# Patient Record
Sex: FEMALE | Race: WHITE | NOT HISPANIC OR LATINO
[De-identification: names, ages, dates, MRNs, and addresses within clinical notes are randomized per-mention and may not be internally consistent; named-entity substitution may affect disease eponyms.]

---

## 2018-05-21 ENCOUNTER — APPOINTMENT (OUTPATIENT)
Dept: ORTHOPEDIC SURGERY | Facility: CLINIC | Age: 78
End: 2018-05-21
Payer: MEDICARE

## 2018-05-21 VITALS — WEIGHT: 200 LBS | BODY MASS INDEX: 34.15 KG/M2 | HEIGHT: 64 IN

## 2018-05-21 PROCEDURE — 20552 NJX 1/MLT TRIGGER POINT 1/2: CPT

## 2018-05-21 PROCEDURE — 99214 OFFICE O/P EST MOD 30 MIN: CPT | Mod: 25

## 2018-09-17 ENCOUNTER — APPOINTMENT (OUTPATIENT)
Dept: ORTHOPEDIC SURGERY | Facility: CLINIC | Age: 78
End: 2018-09-17
Payer: MEDICARE

## 2018-09-17 VITALS — SYSTOLIC BLOOD PRESSURE: 125 MMHG | DIASTOLIC BLOOD PRESSURE: 81 MMHG | HEART RATE: 85 BPM

## 2018-09-17 PROCEDURE — 20552 NJX 1/MLT TRIGGER POINT 1/2: CPT

## 2018-09-17 PROCEDURE — 99214 OFFICE O/P EST MOD 30 MIN: CPT | Mod: 25

## 2018-10-15 ENCOUNTER — APPOINTMENT (OUTPATIENT)
Dept: ORTHOPEDIC SURGERY | Facility: CLINIC | Age: 78
End: 2018-10-15
Payer: MEDICARE

## 2018-10-15 VITALS
DIASTOLIC BLOOD PRESSURE: 80 MMHG | HEIGHT: 64 IN | SYSTOLIC BLOOD PRESSURE: 127 MMHG | BODY MASS INDEX: 34.15 KG/M2 | WEIGHT: 200 LBS | HEART RATE: 80 BPM

## 2018-10-15 PROCEDURE — 20552 NJX 1/MLT TRIGGER POINT 1/2: CPT

## 2018-10-15 PROCEDURE — 99214 OFFICE O/P EST MOD 30 MIN: CPT | Mod: 25

## 2018-10-25 ENCOUNTER — APPOINTMENT (OUTPATIENT)
Dept: ORTHOPEDIC SURGERY | Facility: CLINIC | Age: 78
End: 2018-10-25
Payer: MEDICARE

## 2018-10-25 VITALS
HEART RATE: 90 BPM | HEIGHT: 64 IN | DIASTOLIC BLOOD PRESSURE: 84 MMHG | BODY MASS INDEX: 34.15 KG/M2 | WEIGHT: 200 LBS | SYSTOLIC BLOOD PRESSURE: 129 MMHG

## 2018-10-25 PROCEDURE — 20611 DRAIN/INJ JOINT/BURSA W/US: CPT | Mod: RT

## 2018-10-25 PROCEDURE — 99213 OFFICE O/P EST LOW 20 MIN: CPT | Mod: 25

## 2018-12-06 ENCOUNTER — APPOINTMENT (OUTPATIENT)
Dept: ORTHOPEDIC SURGERY | Facility: CLINIC | Age: 78
End: 2018-12-06
Payer: MEDICARE

## 2018-12-06 PROCEDURE — 20611 DRAIN/INJ JOINT/BURSA W/US: CPT | Mod: RT

## 2018-12-06 PROCEDURE — 99213 OFFICE O/P EST LOW 20 MIN: CPT | Mod: 25

## 2019-01-11 ENCOUNTER — APPOINTMENT (OUTPATIENT)
Dept: ORTHOPEDIC SURGERY | Facility: CLINIC | Age: 79
End: 2019-01-11
Payer: MEDICARE

## 2019-01-11 VITALS
HEART RATE: 84 BPM | BODY MASS INDEX: 34.15 KG/M2 | DIASTOLIC BLOOD PRESSURE: 80 MMHG | WEIGHT: 200 LBS | HEIGHT: 64 IN | SYSTOLIC BLOOD PRESSURE: 121 MMHG

## 2019-01-11 PROCEDURE — 99213 OFFICE O/P EST LOW 20 MIN: CPT

## 2019-01-11 NOTE — ADDENDUM
[FreeTextEntry1] : This note was authored by Ayah Westbrook working as a medical scribe for Dr. David Avila. The note was reviewed, edited, and revised by Dr. David Avila whom is in agreement with the exam findings, imaging findings, and treatment plan. 01/11/2019.

## 2019-01-11 NOTE — HISTORY OF PRESENT ILLNESS
[Pain] : pain [Stable] : stable [All Other ROS Normal] : All other review of systems are negative except as noted [Headache] : no headache [Dizziness] : no dizziness [Fainting] : no fainting [FreeTextEntry1] : right SI joint pain\par had injections and Chiropractic care\par last injection helped [FreeTextEntry2] : Pt reports that SIJ injection under ultrasound have helped but the pain returns.\par no leg pains\par TPI by Dr. De Jesus, helped \par No fever chills sweats nausea vomiting no bowel,  no recent weight loss or gain no night pain. This history is in addition to the intake form that I personally reviewed. \par

## 2019-01-11 NOTE — PHYSICAL EXAM
[Obese] : obese [Normal] : normal [Limited] : is limited [UE/LE] : Sensory: Intact in bilateral upper & lower extremities [ALL] : dorsalis pedis, posterior tibial, femoral, popliteal, and radial 2+ and symmetric bilaterally [Poor Appearance] : well-appearing [Acute Distress] : not in acute distress [Abl Mood] : in a normal mood [Abl Affect] : with normal affect [Poor Coordination] : normal coordination [Disorientation] : oriented x 3 [SLR] : negative straight leg raise [FreeTextEntry2] : right SI joint pain\par otherwise, 5 out of 5 motor strength, sensation is intact and symmetrical full range of motion flexion extension and rotation, no palpatory tenderness full range of motion of hips knees shoulders and elbows (all four extremities), no atrophy, negative straight leg raise, no pathological reflexes, no swelling, normal ambulation, no apparent distress skin is intact, no palpable lymph nodes, no upper or lower extremity instability, alert and oriented x3 and normal mood. Normal finger-to nose test.  [de-identified] : MRI lumbar 10/16-moderate stenosis above at [id number] with the patient.

## 2019-01-11 NOTE — DISCUSSION/SUMMARY
[de-identified] : right sacroiliitis\par failed injections\par wants to discuss SI joing fusion\par Dr. Dexter Simeon for consult SIJ fusion\par All options discussed including rest, medicine, home exercise, acupuncture, Chiropractic care, Physical Therapy, Pain management, and last resort surgery. \par All questions were answered, all alternatives discussed and the patient is in complete agreement with that plan. Follow-up appointment as instructed. Any issues and the patient will call or come in sooner.

## 2019-01-22 ENCOUNTER — APPOINTMENT (OUTPATIENT)
Dept: ORTHOPEDIC SURGERY | Facility: CLINIC | Age: 79
End: 2019-01-22
Payer: MEDICARE

## 2019-01-22 VITALS
BODY MASS INDEX: 34.15 KG/M2 | DIASTOLIC BLOOD PRESSURE: 83 MMHG | SYSTOLIC BLOOD PRESSURE: 130 MMHG | HEART RATE: 111 BPM | WEIGHT: 200 LBS | HEIGHT: 64 IN

## 2019-01-22 PROCEDURE — 72190 X-RAY EXAM OF PELVIS: CPT

## 2019-01-22 PROCEDURE — 99214 OFFICE O/P EST MOD 30 MIN: CPT

## 2019-01-22 RX ORDER — ESOMEPRAZOLE MAGNESIUM 40 MG/1
CAPSULE, DELAYED RELEASE ORAL
Refills: 0 | Status: ACTIVE | COMMUNITY

## 2019-01-22 RX ORDER — CELECOXIB 200 MG/1
200 CAPSULE ORAL
Refills: 0 | Status: ACTIVE | COMMUNITY

## 2019-01-22 NOTE — HISTORY OF PRESENT ILLNESS
[de-identified] : 79 yo F referred by Dr Jamison for evaluation of Sacroiliitis . Hx of Lumbar spine 4/5 Fusion with continued LBP and sacral pain. She has difficulty with stairs and ADLs. She has been treated in the past with PT, which has not helped and injections which have temporarily helped. Previous injctions have be US guided

## 2019-01-22 NOTE — DISCUSSION/SUMMARY
[de-identified] : This patient may be a candidate for percutaneous sacroiliac joint fusion due to the significant degenerative appearance on x-ray. However, we have not yet obtained a CT scan of the pelvis. Nor, having done a CT guided injection. We will obtain a CT guided injection of the right sacroiliac joint. We will see if this helps with her pain. I also believe that her significant abductor weakness as a contributing factor. The need for strengthening and rehabilitation after surgery and noted to obtain a long-standing positive results as discussed. She'll follow up 2 weeks after the CT guided injection so we can finalize plans

## 2019-01-22 NOTE — PHYSICAL EXAM
[de-identified] : Examination of back and pelvic region:  There previously well-healed incisions her mid line lumbar back region. There is no tenderness in this area. There is mild paraspinal spasm. Patient is severely obese. There is tenderness over the gluteal musculature. Sciatic notch is unable to be palpated. Mild right sided pain on flexion, abduction, external rotation.  no pain on the left with the same maneuver. She has significantly weak abductors and quadriceps 4-/5 [de-identified] : 3 views of the pelvis show a previously placed lumbar hardware. There is significant arthrosis of the bilateral sacroiliac joints

## 2019-01-24 ENCOUNTER — FORM ENCOUNTER (OUTPATIENT)
Age: 79
End: 2019-01-24

## 2019-01-25 ENCOUNTER — OUTPATIENT (OUTPATIENT)
Dept: OUTPATIENT SERVICES | Facility: HOSPITAL | Age: 79
LOS: 1 days | End: 2019-01-25
Payer: MEDICARE

## 2019-01-25 ENCOUNTER — APPOINTMENT (OUTPATIENT)
Dept: CT IMAGING | Facility: CLINIC | Age: 79
End: 2019-01-25
Payer: MEDICARE

## 2019-01-25 DIAGNOSIS — M20.009 UNSPECIFIED DEFORMITY OF UNSPECIFIED FINGER(S): Chronic | ICD-10-CM

## 2019-01-25 DIAGNOSIS — M46.1 SACROILIITIS, NOT ELSEWHERE CLASSIFIED: ICD-10-CM

## 2019-01-25 DIAGNOSIS — Z98.89 OTHER SPECIFIED POSTPROCEDURAL STATES: Chronic | ICD-10-CM

## 2019-01-25 PROCEDURE — G0260: CPT

## 2019-01-25 PROCEDURE — G0260: CPT | Mod: RT

## 2019-01-25 PROCEDURE — 27096 INJECT SACROILIAC JOINT: CPT | Mod: RT

## 2019-02-12 ENCOUNTER — APPOINTMENT (OUTPATIENT)
Dept: ORTHOPEDIC SURGERY | Facility: CLINIC | Age: 79
End: 2019-02-12
Payer: MEDICARE

## 2019-02-12 PROCEDURE — 99214 OFFICE O/P EST MOD 30 MIN: CPT

## 2019-02-12 NOTE — DISCUSSION/SUMMARY
[de-identified] : This patient is a candidate for percutaneous sacroiliac joint fusion due to the significant degenerative appearance on x-ray And her positive response to the CT-guided injection.  The need for strengthening and rehabilitation after surgery and noted to obtain a long-standing positive results as discussed. She wishes to proceed with percutaneous sacroiliac joint fusion. The details of the procedure discussed in detail.\par \par The risks benefits and alternatives of the procedure been discussed with the patient. The risks including but not limited to bleeding, infection, damage to nerves and/or blood vessels, nonunion, malunion, hardware failure, DVT, PE, loss of limb, loss of life were explained to the patient who understands these risks and will consent to the procedure. The patient will go for preoperative testing.\par

## 2019-02-12 NOTE — HISTORY OF PRESENT ILLNESS
[de-identified] : 77 yo F referred by Dr Jamison for evaluation of Sacroiliitis . Hx of Lumbar spine 4/5 Fusion with continued LBP and sacral pain. She has difficulty with stairs and ADLs. She has been treated in the past with PT, which has not helped and injections which have temporarily helped. Previous injctions have be US guided.  She returns today for followup after her CT guided sacroiliac joint injection on the right side. The patient states that she had 10 days of complete pain relief after the injection

## 2019-02-12 NOTE — PHYSICAL EXAM
[de-identified] : Examination of back and pelvic region:  There previously well-healed incisions her mid line lumbar back region. There is no tenderness in this area. There is mild paraspinal spasm. Patient is severely obese. There is tenderness over the gluteal musculature. Sciatic notch is unable to be palpated. Mild right sided pain on flexion, abduction, external rotation.  no pain on the left with the same maneuver. She has significantly weak abductors and quadriceps 4-/5 [de-identified] : 3 views of the pelvis show a previously placed lumbar hardware. There is significant arthrosis of the bilateral sacroiliac joints

## 2019-02-20 ENCOUNTER — OUTPATIENT (OUTPATIENT)
Dept: OUTPATIENT SERVICES | Facility: HOSPITAL | Age: 79
LOS: 1 days | End: 2019-02-20
Payer: MEDICARE

## 2019-02-20 VITALS
HEIGHT: 64 IN | HEART RATE: 84 BPM | DIASTOLIC BLOOD PRESSURE: 82 MMHG | TEMPERATURE: 98 F | SYSTOLIC BLOOD PRESSURE: 120 MMHG | RESPIRATION RATE: 16 BRPM | OXYGEN SATURATION: 97 % | WEIGHT: 212.31 LBS

## 2019-02-20 DIAGNOSIS — Z01.818 ENCOUNTER FOR OTHER PREPROCEDURAL EXAMINATION: ICD-10-CM

## 2019-02-20 DIAGNOSIS — M46.1 SACROILIITIS, NOT ELSEWHERE CLASSIFIED: ICD-10-CM

## 2019-02-20 DIAGNOSIS — Z98.890 OTHER SPECIFIED POSTPROCEDURAL STATES: Chronic | ICD-10-CM

## 2019-02-20 DIAGNOSIS — Z91.040 LATEX ALLERGY STATUS: ICD-10-CM

## 2019-02-20 DIAGNOSIS — M20.009 UNSPECIFIED DEFORMITY OF UNSPECIFIED FINGER(S): Chronic | ICD-10-CM

## 2019-02-20 DIAGNOSIS — Z98.89 OTHER SPECIFIED POSTPROCEDURAL STATES: Chronic | ICD-10-CM

## 2019-02-20 LAB
ANION GAP SERPL CALC-SCNC: 12 MMOL/L — SIGNIFICANT CHANGE UP (ref 5–17)
BLD GP AB SCN SERPL QL: NEGATIVE — SIGNIFICANT CHANGE UP
BUN SERPL-MCNC: 22 MG/DL — SIGNIFICANT CHANGE UP (ref 7–23)
CALCIUM SERPL-MCNC: 9.9 MG/DL — SIGNIFICANT CHANGE UP (ref 8.4–10.5)
CHLORIDE SERPL-SCNC: 106 MMOL/L — SIGNIFICANT CHANGE UP (ref 96–108)
CO2 SERPL-SCNC: 23 MMOL/L — SIGNIFICANT CHANGE UP (ref 22–31)
CREAT SERPL-MCNC: 0.96 MG/DL — SIGNIFICANT CHANGE UP (ref 0.5–1.3)
GLUCOSE SERPL-MCNC: 105 MG/DL — HIGH (ref 70–99)
HCT VFR BLD CALC: 46.7 % — HIGH (ref 34.5–45)
HGB BLD-MCNC: 14.7 G/DL — SIGNIFICANT CHANGE UP (ref 11.5–15.5)
MCHC RBC-ENTMCNC: 31.5 GM/DL — LOW (ref 32–36)
MCHC RBC-ENTMCNC: 32.5 PG — SIGNIFICANT CHANGE UP (ref 27–34)
MCV RBC AUTO: 103.1 FL — HIGH (ref 80–100)
PLATELET # BLD AUTO: 293 K/UL — SIGNIFICANT CHANGE UP (ref 150–400)
POTASSIUM SERPL-MCNC: 4.8 MMOL/L — SIGNIFICANT CHANGE UP (ref 3.5–5.3)
POTASSIUM SERPL-SCNC: 4.8 MMOL/L — SIGNIFICANT CHANGE UP (ref 3.5–5.3)
RBC # BLD: 4.53 M/UL — SIGNIFICANT CHANGE UP (ref 3.8–5.2)
RBC # FLD: 14.4 % — SIGNIFICANT CHANGE UP (ref 10.3–14.5)
RH IG SCN BLD-IMP: POSITIVE — SIGNIFICANT CHANGE UP
SODIUM SERPL-SCNC: 141 MMOL/L — SIGNIFICANT CHANGE UP (ref 135–145)
WBC # BLD: 7.07 K/UL — SIGNIFICANT CHANGE UP (ref 3.8–10.5)
WBC # FLD AUTO: 7.07 K/UL — SIGNIFICANT CHANGE UP (ref 3.8–10.5)

## 2019-02-20 PROCEDURE — 85027 COMPLETE CBC AUTOMATED: CPT

## 2019-02-20 PROCEDURE — 86900 BLOOD TYPING SEROLOGIC ABO: CPT

## 2019-02-20 PROCEDURE — 80048 BASIC METABOLIC PNL TOTAL CA: CPT

## 2019-02-20 PROCEDURE — G0463: CPT

## 2019-02-20 PROCEDURE — 86850 RBC ANTIBODY SCREEN: CPT

## 2019-02-20 PROCEDURE — 86901 BLOOD TYPING SEROLOGIC RH(D): CPT

## 2019-02-20 RX ORDER — SODIUM CHLORIDE 9 MG/ML
3 INJECTION INTRAMUSCULAR; INTRAVENOUS; SUBCUTANEOUS EVERY 8 HOURS
Qty: 0 | Refills: 0 | Status: DISCONTINUED | OUTPATIENT
Start: 2019-03-07 | End: 2019-03-07

## 2019-02-20 RX ORDER — LIDOCAINE HCL 20 MG/ML
0.2 VIAL (ML) INJECTION ONCE
Qty: 0 | Refills: 0 | Status: DISCONTINUED | OUTPATIENT
Start: 2019-03-07 | End: 2019-03-07

## 2019-02-20 RX ORDER — VANCOMYCIN HCL 1 G
1500 VIAL (EA) INTRAVENOUS ONCE
Qty: 0 | Refills: 0 | Status: DISCONTINUED | OUTPATIENT
Start: 2019-03-07 | End: 2019-03-22

## 2019-02-20 NOTE — H&P PST ADULT - PSH
S/P lumbar laminectomy  w/fusion 6/2014  S/P wrist surgery  tendonitis- bilaterally H/O lithotripsy  2015 - unsuccessful; passed stone on own 1/2018  S/P lumbar laminectomy  w/fusion 6/2014  S/P wrist surgery  tendonitis- bilaterally

## 2019-02-20 NOTE — H&P PST ADULT - NSANTHOSAYNRD_GEN_A_CORE
No. ANGI screening performed.  STOP BANG Legend: 0-2 = LOW Risk; 3-4 = INTERMEDIATE Risk; 5-8 = HIGH Risk

## 2019-02-20 NOTE — H&P PST ADULT - HISTORY OF PRESENT ILLNESS
75 year old female with Lumbar stenosis d/p laminectomy with fusion 6/2014 with complaints of lower back pain and right hip pain worsening since 8/2015 s/p MRI  lumbar with findings of left hydronephrosis further work up revealed Left ureter calculus with left mod-severe hydronephrosis planned for Left ESWL, Left stent insertion, possible left ureteroscopy. 78 year old female with h/o hypothyroidism, lymphocytic colitis (on Lialda), lumbar spinal stenosis s/p laminectomy with fusion 6/2014 and sacroiliitis with c/o persistent pain in right buttocks, which worsens with standing and limits activity. Pt is scheduled for Right Sacroiliac Joint Fusion on 3/4/2019.

## 2019-02-20 NOTE — H&P PST ADULT - ACTIVITY
active as tolerated, house chores, cleaning, full time job, able to go up and down 1-2 flights without distress. active as tolerated, house chores, cleaning, activity limited by right SI joint painfull time job, able to go up and down 1-2 flights without distress.

## 2019-02-20 NOTE — H&P PST ADULT - PMH
Acquired spondylolisthesis    Back pain, lumbosacral  chronic, radiates to right hip, plan for epidural injections in the future.  De Quervain's tenosynovitis, right  wrist s/p steroid injections x2( last dose 1 week ago  Eczema  legs  Hypothyroidism    Lymphocytic colitis    Obesity    Tendonitis  thumb Acquired spondylolisthesis    Back pain, lumbosacral  prior to spinal fusion  De Quervain's tenosynovitis, right  wrist s/p steroid injections x2( last dose 1 week ago  Eczema  legs  Hypothyroidism    Latex allergy    Lymphocytic colitis    Obesity    Sacroiliitis    Tendonitis  thumb

## 2019-03-06 ENCOUNTER — TRANSCRIPTION ENCOUNTER (OUTPATIENT)
Age: 79
End: 2019-03-06

## 2019-03-07 ENCOUNTER — APPOINTMENT (OUTPATIENT)
Dept: ORTHOPEDIC SURGERY | Facility: HOSPITAL | Age: 79
End: 2019-03-07

## 2019-03-07 ENCOUNTER — OUTPATIENT (OUTPATIENT)
Dept: OUTPATIENT SERVICES | Facility: HOSPITAL | Age: 79
LOS: 1 days | End: 2019-03-07
Payer: MEDICARE

## 2019-03-07 VITALS
TEMPERATURE: 98 F | RESPIRATION RATE: 16 BRPM | SYSTOLIC BLOOD PRESSURE: 140 MMHG | HEART RATE: 84 BPM | DIASTOLIC BLOOD PRESSURE: 77 MMHG | OXYGEN SATURATION: 95 %

## 2019-03-07 VITALS
OXYGEN SATURATION: 97 % | HEIGHT: 64 IN | SYSTOLIC BLOOD PRESSURE: 117 MMHG | TEMPERATURE: 98 F | WEIGHT: 212.31 LBS | DIASTOLIC BLOOD PRESSURE: 83 MMHG | RESPIRATION RATE: 16 BRPM | HEART RATE: 103 BPM

## 2019-03-07 DIAGNOSIS — Z98.890 OTHER SPECIFIED POSTPROCEDURAL STATES: Chronic | ICD-10-CM

## 2019-03-07 DIAGNOSIS — Z01.818 ENCOUNTER FOR OTHER PREPROCEDURAL EXAMINATION: ICD-10-CM

## 2019-03-07 DIAGNOSIS — Z98.89 OTHER SPECIFIED POSTPROCEDURAL STATES: Chronic | ICD-10-CM

## 2019-03-07 DIAGNOSIS — M46.1 SACROILIITIS, NOT ELSEWHERE CLASSIFIED: ICD-10-CM

## 2019-03-07 LAB — RH IG SCN BLD-IMP: POSITIVE — SIGNIFICANT CHANGE UP

## 2019-03-07 PROCEDURE — 76000 FLUOROSCOPY <1 HR PHYS/QHP: CPT

## 2019-03-07 PROCEDURE — 86900 BLOOD TYPING SEROLOGIC ABO: CPT

## 2019-03-07 PROCEDURE — 86901 BLOOD TYPING SEROLOGIC RH(D): CPT

## 2019-03-07 PROCEDURE — 27279 ARTHRD SI JT PLMT TARTCLR DV: CPT | Mod: RT

## 2019-03-07 PROCEDURE — C1776: CPT

## 2019-03-07 PROCEDURE — 97162 PT EVAL MOD COMPLEX 30 MIN: CPT

## 2019-03-07 RX ORDER — VANCOMYCIN HCL 1 G
1000 VIAL (EA) INTRAVENOUS ONCE
Qty: 0 | Refills: 0 | Status: DISCONTINUED | OUTPATIENT
Start: 2019-03-08 | End: 2019-03-22

## 2019-03-07 RX ORDER — SENNA PLUS 8.6 MG/1
2 TABLET ORAL AT BEDTIME
Qty: 0 | Refills: 0 | Status: DISCONTINUED | OUTPATIENT
Start: 2019-03-07 | End: 2019-03-22

## 2019-03-07 RX ORDER — OXYCODONE HYDROCHLORIDE 5 MG/1
5 TABLET ORAL EVERY 4 HOURS
Qty: 0 | Refills: 0 | Status: DISCONTINUED | OUTPATIENT
Start: 2019-03-07 | End: 2019-03-07

## 2019-03-07 RX ORDER — ONDANSETRON 8 MG/1
4 TABLET, FILM COATED ORAL ONCE
Qty: 0 | Refills: 0 | Status: DISCONTINUED | OUTPATIENT
Start: 2019-03-07 | End: 2019-03-07

## 2019-03-07 RX ORDER — TRAMADOL HYDROCHLORIDE 50 MG/1
50 TABLET ORAL EVERY 8 HOURS
Qty: 0 | Refills: 0 | Status: COMPLETED | OUTPATIENT
Start: 2019-03-07 | End: 2019-03-14

## 2019-03-07 RX ORDER — POLYETHYLENE GLYCOL 3350 17 G/17G
17 POWDER, FOR SOLUTION ORAL DAILY
Qty: 0 | Refills: 0 | Status: DISCONTINUED | OUTPATIENT
Start: 2019-03-07 | End: 2019-03-22

## 2019-03-07 RX ORDER — MAGNESIUM HYDROXIDE 400 MG/1
30 TABLET, CHEWABLE ORAL DAILY
Qty: 0 | Refills: 0 | Status: DISCONTINUED | OUTPATIENT
Start: 2019-03-07 | End: 2019-03-22

## 2019-03-07 RX ORDER — ASPIRIN/CALCIUM CARB/MAGNESIUM 324 MG
1 TABLET ORAL
Qty: 60 | Refills: 0
Start: 2019-03-07 | End: 2019-04-05

## 2019-03-07 RX ORDER — ACETAMINOPHEN 500 MG
650 TABLET ORAL EVERY 6 HOURS
Qty: 0 | Refills: 0 | Status: DISCONTINUED | OUTPATIENT
Start: 2019-03-07 | End: 2019-03-22

## 2019-03-07 RX ORDER — HYDROMORPHONE HYDROCHLORIDE 2 MG/ML
0.5 INJECTION INTRAMUSCULAR; INTRAVENOUS; SUBCUTANEOUS
Qty: 0 | Refills: 0 | Status: DISCONTINUED | OUTPATIENT
Start: 2019-03-07 | End: 2019-03-07

## 2019-03-07 RX ORDER — ONDANSETRON 8 MG/1
4 TABLET, FILM COATED ORAL EVERY 6 HOURS
Qty: 0 | Refills: 0 | Status: DISCONTINUED | OUTPATIENT
Start: 2019-03-07 | End: 2019-03-22

## 2019-03-07 RX ORDER — ASPIRIN/CALCIUM CARB/MAGNESIUM 324 MG
325 TABLET ORAL
Qty: 0 | Refills: 0 | Status: DISCONTINUED | OUTPATIENT
Start: 2019-03-07 | End: 2019-03-22

## 2019-03-07 RX ORDER — PANTOPRAZOLE SODIUM 20 MG/1
40 TABLET, DELAYED RELEASE ORAL
Qty: 0 | Refills: 0 | Status: DISCONTINUED | OUTPATIENT
Start: 2019-03-07 | End: 2019-03-22

## 2019-03-07 RX ORDER — DOCUSATE SODIUM 100 MG
100 CAPSULE ORAL THREE TIMES A DAY
Qty: 0 | Refills: 0 | Status: DISCONTINUED | OUTPATIENT
Start: 2019-03-07 | End: 2019-03-22

## 2019-03-07 RX ORDER — MORPHINE SULFATE 50 MG/1
2 CAPSULE, EXTENDED RELEASE ORAL
Qty: 0 | Refills: 0 | Status: DISCONTINUED | OUTPATIENT
Start: 2019-03-07 | End: 2019-03-07

## 2019-03-07 RX ORDER — SODIUM CHLORIDE 9 MG/ML
1000 INJECTION INTRAMUSCULAR; INTRAVENOUS; SUBCUTANEOUS
Qty: 0 | Refills: 0 | Status: DISCONTINUED | OUTPATIENT
Start: 2019-03-07 | End: 2019-03-22

## 2019-03-07 RX ORDER — HYDROMORPHONE HYDROCHLORIDE 2 MG/ML
1 INJECTION INTRAMUSCULAR; INTRAVENOUS; SUBCUTANEOUS EVERY 4 HOURS
Qty: 0 | Refills: 0 | Status: DISCONTINUED | OUTPATIENT
Start: 2019-03-07 | End: 2019-03-07

## 2019-03-07 RX ORDER — DOCUSATE SODIUM 100 MG
1 CAPSULE ORAL
Qty: 0 | Refills: 0 | DISCHARGE
Start: 2019-03-07

## 2019-03-07 RX ORDER — OXYCODONE HYDROCHLORIDE 5 MG/1
10 TABLET ORAL EVERY 4 HOURS
Qty: 0 | Refills: 0 | Status: DISCONTINUED | OUTPATIENT
Start: 2019-03-07 | End: 2019-03-07

## 2019-03-07 RX ORDER — OXYCODONE AND ACETAMINOPHEN 5; 325 MG/1; MG/1
1 TABLET ORAL
Qty: 40 | Refills: 0
Start: 2019-03-07

## 2019-03-07 NOTE — PROGRESS NOTE ADULT - SUBJECTIVE AND OBJECTIVE BOX
ORTHO ATTENDING POST OP    s/p  R   SI joint fusion with I-Fuse  TTWB      R   LE   BID  Crutch Training  Percocet to [facility]   Shower OK w aquacel in place  Pt can be D/D'd from PACU  f/u 2 weeks

## 2019-03-07 NOTE — ASU DISCHARGE PLAN (ADULT/PEDIATRIC) - CALL YOUR DOCTOR IF YOU HAVE ANY OF THE FOLLOWING:
Bleeding that does not stop/Swelling that gets worse/Nausea and vomiting that does not stop/Unable to urinate/Fever greater than (need to indicate Fahrenheit or Celsius)/Wound/Surgical Site with redness, or foul smelling discharge or pus/Numbness, tingling, color or temperature change to extremity/Inability to tolerate liquids or foods/Pain not relieved by Medications

## 2019-03-07 NOTE — ASU DISCHARGE PLAN (ADULT/PEDIATRIC) - ASU DC SPECIAL INSTRUCTIONSFT
Take Tylenol or Motrin as needed for mild pain  Take Percocet as needed, as prescribed  Take Ecotrin twice daily with food for blood clot prevention    Maintain toe-touch weight bearing on the Right leg with crutches. Do not bear full weight with the right leg. You may shower, but keep the right buttock bandage intact.     Follow up in 10-14 days with Dr. Simeon
No

## 2019-03-07 NOTE — PHYSICAL THERAPY INITIAL EVALUATION ADULT - ADDITIONAL COMMENTS
Pt lives in apartment with elevator access to floor however 3 steps to enter and 3 up or down to reach elevators. Prior to admission pt was independent with all functional mobility.

## 2019-03-07 NOTE — PHYSICAL THERAPY INITIAL EVALUATION ADULT - PERTINENT HX OF CURRENT PROBLEM, REHAB EVAL
78 year old female with h/o hypothyroidism, lymphocytic colitis (on Lialda), lumbar spinal stenosis s/p laminectomy with fusion 6/2014 and sacroiliitis with c/o persistent pain in right buttocks, which worsens with standing and limits activity. Pt is scheduled for Right Sacroiliac Joint Fusion on 3/4/2019.

## 2019-03-07 NOTE — ASU DISCHARGE PLAN (ADULT/PEDIATRIC) - CARE PROVIDER_API CALL
Dexter Simeon (MD)  Orthopaedic Surgery  [address]  [place] [address]  Phone: [phone]  Fax: [phone]  Follow Up Time:

## 2019-03-07 NOTE — ASU DISCHARGE PLAN (ADULT/PEDIATRIC) - PAIN MANAGEMENT
Prescription called to pharmacy/[facility] at [place] Prescriptions electronically submitted to pharmacy from [place]/Prescription called to pharmacy/[facility] at [place]

## 2019-03-07 NOTE — BRIEF OPERATIVE NOTE - PROCEDURE
<<-----Click on this checkbox to enter Procedure Arthrodesis, sacroiliac joint  03/07/2019    Active  DSTAL

## 2019-03-07 NOTE — PROGRESS NOTE ADULT - SUBJECTIVE AND OBJECTIVE BOX
ORTHO POC NOTE    Resting without complaints. Seen in PACU. No Chest Pain, SOB, N/V.  Has not voided. Had sena intraop.    T(C): 36.4 (03-07-19 @ 11:00), Max: 36.6 (03-07-19 @ 09:33)  HR: 70 (03-07-19 @ 11:30) (66 - 103)  BP: 135/77 (03-07-19 @ 11:30) (117/83 - 150/106)  RR: 14 (03-07-19 @ 11:00) (14 - 16)  SpO2: 97    Exam:   Alert and Oriented; No Acute Distress, family at bedside  Card: +S1/S2, RRR  Pulm: CTAB  Ext: R Hip: Aquacel dressing C/D/I, Dull Sensation grossly intact to light touch.  Calves soft, non-tender bilaterally  (+) PF/DF  (+) Distal pulses    Postop Xray: In chart       Patient is a 78y old  Female S/P Right SI Fusion     Plan:  - Pain Control PRN  - DVT ppx- ASA 325mg PO BID  - RLE TTWB  - Venodynes/IS  - Discharge to Home after voids and has walker (Patient cleared by PT- awaiting delivery of walker to [facility])    Cherry Jones PA-C  Orthopedic Surgery  [id number]

## 2019-03-08 PROBLEM — M46.1 SACROILIITIS, NOT ELSEWHERE CLASSIFIED: Chronic | Status: ACTIVE | Noted: 2019-02-20

## 2019-03-08 PROBLEM — Z91.040 LATEX ALLERGY STATUS: Chronic | Status: ACTIVE | Noted: 2019-02-20

## 2019-03-08 PROBLEM — K52.832 LYMPHOCYTIC COLITIS: Chronic | Status: ACTIVE | Noted: 2019-02-20

## 2019-03-20 ENCOUNTER — APPOINTMENT (OUTPATIENT)
Dept: ORTHOPEDIC SURGERY | Facility: CLINIC | Age: 79
End: 2019-03-20
Payer: MEDICARE

## 2019-03-20 PROCEDURE — 99024 POSTOP FOLLOW-UP VISIT: CPT

## 2019-03-22 NOTE — HISTORY OF PRESENT ILLNESS
[Chills] : no chills [Fever] : no fever [Nausea] : no nausea [Vomiting] : no vomiting [Clean/Dry/Intact] : clean, dry and intact [Healed] : healed [Erythema] : not erythematous [Neuro Intact] : an unremarkable neurological exam [Vascular Intact] : ~T peripheral vascular exam normal [Negative Omero's] : maneuvers demonstrated a negative Omero's sign [Doing Well] : is doing well [Excellent Pain Control] : has excellent pain control [No Sign of Infection] : is showing no signs of infection [Staples Removed] : staples were removed [None] : None [de-identified] : s/p R SIJ fusion 3/7/19 [de-identified] : s/p R SIJ fusion 3/7/19.  Here for her first postoperative visit. She is overall doing well and feeling better. She is taking an occasional Percocet, at night [de-identified] : Examination of the right gluteal region shows a healed incision. Staples are in place. No signs of infection. She can ambulate without pain. Straight leg raise and abduct 4/5 strength [de-identified] : She'll be weightbearing as tolerated. She is given a prescription for physical therapy for range of motion strengthening. She is encouraged to participate in an exercise program including water exercises. We will see her back in 6 weeks

## 2019-04-10 ENCOUNTER — APPOINTMENT (OUTPATIENT)
Dept: ORTHOPEDIC SURGERY | Facility: CLINIC | Age: 79
End: 2019-04-10
Payer: MEDICARE

## 2019-04-10 DIAGNOSIS — M53.3 SACROCOCCYGEAL DISORDERS, NOT ELSEWHERE CLASSIFIED: ICD-10-CM

## 2019-04-10 PROCEDURE — 99024 POSTOP FOLLOW-UP VISIT: CPT

## 2019-04-10 PROCEDURE — 72190 X-RAY EXAM OF PELVIS: CPT

## 2019-04-12 PROBLEM — M53.3 SI (SACROILIAC) JOINT DYSFUNCTION: Status: ACTIVE | Noted: 2018-10-29

## 2019-04-12 NOTE — HISTORY OF PRESENT ILLNESS
[Chills] : no chills [Fever] : no fever [Nausea] : no nausea [Clean/Dry/Intact] : clean, dry and intact [Vomiting] : no vomiting [Healed] : healed [Erythema] : not erythematous [Neuro Intact] : an unremarkable neurological exam [Vascular Intact] : ~T peripheral vascular exam normal [Negative Omero's] : maneuvers demonstrated a negative Omero's sign [Doing Well] : is doing well [Excellent Pain Control] : has excellent pain control [No Sign of Infection] : is showing no signs of infection [None] : None [de-identified] : s/p R SIJ fusion 3/7/19 [de-identified] : s/p R SIJ fusion 3/7/19.  Here for a postoperative visit. She is overall doing well and feeling better. She is no longer taking pain medication. She states she has been compliant with toe-touch weightbearing. She states the pain that she is feeling preoperatively is now gone [de-identified] : Examination of the right gluteal region shows a healed incision. She can ambulate without pain. Straight leg raise and abduct 4/5 strength/. Note pain on JOSE DANIEL [de-identified] : 3 views of the pelvis: A-P, inlet and outlet show a well aligned pelvic ring with a I- fuse implants in place [de-identified] : She'll be weightbearing as tolerated. She is given a prescription for physical therapy for range of motion strengthening. We have advanced her to weightbearing as tolerated.  She is encouraged to participate in an exercise program including water exercises. We will see her back in 6 weeks

## 2019-05-22 ENCOUNTER — APPOINTMENT (OUTPATIENT)
Dept: ORTHOPEDIC SURGERY | Facility: CLINIC | Age: 79
End: 2019-05-22
Payer: MEDICARE

## 2019-05-22 PROCEDURE — 99024 POSTOP FOLLOW-UP VISIT: CPT

## 2019-05-22 PROCEDURE — 72190 X-RAY EXAM OF PELVIS: CPT

## 2019-05-23 NOTE — HISTORY OF PRESENT ILLNESS
[Clean/Dry/Intact] : clean, dry and intact [Healed] : healed [Neuro Intact] : an unremarkable neurological exam [Vascular Intact] : ~T peripheral vascular exam normal [Negative Omero's] : maneuvers demonstrated a negative Omero's sign [Doing Well] : is doing well [Excellent Pain Control] : has excellent pain control [No Sign of Infection] : is showing no signs of infection [None] : None [Chills] : no chills [Fever] : no fever [Nausea] : no nausea [Vomiting] : no vomiting [Erythema] : not erythematous [de-identified] : s/p R SIJ fusion 3/7/19 [de-identified] : s/p R SIJ fusion 3/7/19.  Here for a postoperative visit. She is overall doing well and feeling better. She continues to have some gluteal pain. but her pain is significantly less since sugery.  [de-identified] : Examination of the right gluteal region shows a healed incision. She can ambulate without pain. Straight leg raise and abduct 4/5 strength/. No pain on JOSE DANIEL [de-identified] : 3 views of the pelvis: A-P, inlet and outlet show a well aligned pelvic ring with a I- fuse implants in place [de-identified] : She'll continue weightbearing as tolerated. She is given a prescription for physical therapy for range of motion strengthening.  She is encouraged to participate in an exercise program including water exercises. We will see her back in 6 weeks

## 2019-07-17 ENCOUNTER — APPOINTMENT (OUTPATIENT)
Dept: ORTHOPEDIC SURGERY | Facility: CLINIC | Age: 79
End: 2019-07-17
Payer: MEDICARE

## 2019-07-17 PROCEDURE — 99213 OFFICE O/P EST LOW 20 MIN: CPT

## 2019-07-17 PROCEDURE — 72190 X-RAY EXAM OF PELVIS: CPT

## 2019-10-22 ENCOUNTER — APPOINTMENT (OUTPATIENT)
Dept: ORTHOPEDIC SURGERY | Facility: CLINIC | Age: 79
End: 2019-10-22
Payer: MEDICARE

## 2019-10-22 DIAGNOSIS — M46.1 SACROILIITIS, NOT ELSEWHERE CLASSIFIED: ICD-10-CM

## 2019-10-22 PROCEDURE — 99213 OFFICE O/P EST LOW 20 MIN: CPT

## 2019-10-22 PROCEDURE — 72190 X-RAY EXAM OF PELVIS: CPT

## 2019-10-22 NOTE — HISTORY OF PRESENT ILLNESS
[Chills] : no chills [Fever] : no fever [Nausea] : no nausea [Vomiting] : no vomiting [Clean/Dry/Intact] : clean, dry and intact [Healed] : healed [Erythema] : not erythematous [Neuro Intact] : an unremarkable neurological exam [Vascular Intact] : ~T peripheral vascular exam normal [Negative Omero's] : maneuvers demonstrated a negative Omero's sign [Doing Well] : is doing well [Excellent Pain Control] : has excellent pain control [No Sign of Infection] : is showing no signs of infection [de-identified] : s/p R SIJ fusion 3/7/19 [None] : None [de-identified] : Examination of the right gluteal region shows a healed incision. She can ambulate without pain. Straight leg raise and abduct 4+/5 strength. No pain on JOSE DANIEL/ FADER. Normal gait [de-identified] : 3 views of the pelvis: A-P, inlet and outlet show a well aligned pelvic ring with a I- fuse implants in place [de-identified] : s/p R SIJ fusion 3/7/19.  Here for a postoperative visit. She is overall doing well and feeling better.  She has had significant reduction in pain since surgery. She returns today for followup.  She is able to exercise regularly without discomfort [de-identified] : She'll continue weightbearing as tolerated. She will contiue w her home exercise program as she feels this is more effective than PT. . We will see her back in 6 months

## 2019-10-22 NOTE — PHYSICAL EXAM
[Antalgic] : antalgic [UE/LE] : Sensory: Intact in bilateral upper & lower extremities [ALL] : Biceps, brachioradialis, triceps, patellar, ankle and plantar 2+ and symmetric bilaterally

## 2020-03-10 ENCOUNTER — APPOINTMENT (OUTPATIENT)
Dept: ORTHOPEDIC SURGERY | Facility: CLINIC | Age: 80
End: 2020-03-10

## 2020-10-23 ENCOUNTER — APPOINTMENT (OUTPATIENT)
Dept: ORTHOPEDIC SURGERY | Facility: CLINIC | Age: 80
End: 2020-10-23
Payer: MEDICARE

## 2020-10-23 VITALS — TEMPERATURE: 97.3 F

## 2020-10-23 VITALS — DIASTOLIC BLOOD PRESSURE: 81 MMHG | HEART RATE: 92 BPM | SYSTOLIC BLOOD PRESSURE: 134 MMHG

## 2020-10-23 PROCEDURE — 99214 OFFICE O/P EST MOD 30 MIN: CPT

## 2020-10-27 ENCOUNTER — APPOINTMENT (OUTPATIENT)
Dept: ORTHOPEDIC SURGERY | Facility: CLINIC | Age: 80
End: 2020-10-27
Payer: MEDICARE

## 2020-10-27 VITALS
DIASTOLIC BLOOD PRESSURE: 82 MMHG | HEART RATE: 91 BPM | WEIGHT: 205 LBS | HEIGHT: 64 IN | BODY MASS INDEX: 35 KG/M2 | SYSTOLIC BLOOD PRESSURE: 132 MMHG

## 2020-10-27 VITALS — TEMPERATURE: 97.3 F

## 2020-10-27 PROCEDURE — 99214 OFFICE O/P EST MOD 30 MIN: CPT

## 2020-10-27 PROCEDURE — 73564 X-RAY EXAM KNEE 4 OR MORE: CPT | Mod: LT

## 2020-10-27 NOTE — PHYSICAL EXAM
[de-identified] : Patient is well nourished, well-developed, in no acute distress, with appropriate mood and affect. The patient is oriented to time, place, and person. Respirations are even and unlabored. Gait evaluation does reveal a limp. There is no inguinal adenopathy. Examination of the contralateral knee shows normal range of motion, strength, no tenderness, and intact skin. The affected limb is well-perfused, without skin lesions, shows a grossly normal motor and sensory examination. Knee motion is significantly reduced and does cause significant pain. The knee moves from 0 to 125 degrees. The knee is stable within that range-of-motion to AP and ML stress. The alignment of the knee is 5 degrees varus. Muscle strength is normal. Pedal pulses are palpable. Hip examination was negative.\par  [de-identified] : Long standing knee, AP knee, lateral knee, and patellar views of the left knee were ordered and taken in the office and demonstrate degenerative joint disease of the knee with joint space narrowing, osteophyte formation, and subchondral sclerosis.

## 2020-10-27 NOTE — DISCUSSION/SUMMARY
[de-identified] : This patient has left knee osteoarthritis.  I had a discussion with the patient, who is a candidate for left total knee arthroplasty. Risks, benefits and alternatives were discussed. At this point, the patient wants to hold off as the pain is not quite severe enough. I gave them a book on total joint replacements and my contact card. If they want to schedule in the future, then they will come in and we will have a full discussion. \par

## 2020-10-27 NOTE — HISTORY OF PRESENT ILLNESS
[de-identified] : This is very nice 79-year-old female experiencing 5 years of left knee pain, which is severe in intensity. The pain substantially limits activities of daily living. Walking tolerance is reduced.  Tylenol does not help to improve the pain.  She states that she cannot take NSAIDs.  She does use a cane.  She tried intra-articular cortisone injections over the last 5 years and initially states did help but now they no longer help.  The patient denies any radiation of the pain to the feet and it is not associated with numbness, tingling, or weakness.  She tried physical therapy in the past which did not help.

## 2021-05-19 ENCOUNTER — APPOINTMENT (OUTPATIENT)
Dept: ORTHOPEDIC SURGERY | Facility: CLINIC | Age: 81
End: 2021-05-19
Payer: MEDICARE

## 2021-05-19 PROCEDURE — 99214 OFFICE O/P EST MOD 30 MIN: CPT

## 2021-10-19 ENCOUNTER — APPOINTMENT (OUTPATIENT)
Dept: ORTHOPEDIC SURGERY | Facility: CLINIC | Age: 81
End: 2021-10-19
Payer: MEDICARE

## 2021-10-19 VITALS — WEIGHT: 200 LBS | HEIGHT: 64 IN | BODY MASS INDEX: 34.15 KG/M2

## 2021-10-19 DIAGNOSIS — M16.12 UNILATERAL PRIMARY OSTEOARTHRITIS, LEFT HIP: ICD-10-CM

## 2021-10-19 PROCEDURE — 99214 OFFICE O/P EST MOD 30 MIN: CPT

## 2021-10-19 PROCEDURE — 73560 X-RAY EXAM OF KNEE 1 OR 2: CPT | Mod: RT

## 2021-10-19 PROCEDURE — 73564 X-RAY EXAM KNEE 4 OR MORE: CPT | Mod: LT

## 2021-10-19 NOTE — HISTORY OF PRESENT ILLNESS
[de-identified] : Ms. GIO CHA is a 80 year female who presents to office complaining of left knee pain.\par She has been experiencing 6 years of left knee pain, which is severe in intensity. The pain substantially limits activities of daily living. Walking tolerance is reduced. Tylenol does not help to improve the pain. She states that she cannot take NSAIDs. She does use a cane. She tried intra-articular cortisone injections over the last 5 years and initially states they did help but now they no longer help. The patient denies any radiation of the pain to the feet and it is not associated with numbness, tingling, or weakness. She tried physical therapy in the past which did not help. \par Patient saw Dr. Underwood for this left knee pain last year, who indicated patient as a candidate for left TKR, but patient deferred on having this done at the time.\par All review of systems, family history, social history, surgical history, past medical history, medications, and allergies not previously stated as positive are negative. They were reviewed by me today with the patient and documented accordingly.

## 2021-10-19 NOTE — PHYSICAL EXAM
[de-identified] : Constitutional - the patient is of normal build and obese with a BMI of 33.4..  The patient remains oriented to person, time, and  place.  Mood is normal. Vital signs as recorded.  The patients gait is a slight limp but she is having some discomfort in her right sacroiliac joint and some more mild discomfort and stiffness in her left leg possibly knee and hip.. The patient has satisfactory  balance and can stand on toes and heels.\par \par The patient has no difficulty with respiration. Respiration at rest is a normal rate. The patient is not short of breath and has not become short of breath with short ambulation. There is no audible wheezing. No coughing.\par \par Skin is normal for the patient's age. There are no abnormal masses or lymph nodes which stand out in the lower extremities.\par \par Spine -as per Dr. Avila's examination\par \par UPPER EXTREMITIES \par \par Shoulders ROM  is symmetric  and the motion is satisfactory.  There is no significant shoulder pain or limitation in motion which would make using a cane or a walker difficult. Shoulder stability and  strength are satisfactory.\par \par There is normal motion in the wrists and elbows.\par \par Circulation appears satisfactory with pedal pulses present.  There is no major edema in the lower legs. No skin tenderness or increased temperature. No major varicosities.\par \par HIP EXAMINATION the abduction and abduction as well as rotation measurements were taken with the hip in flexion.\par \par Motion\par Her hip show flexion right hip 135 degrees left 135 degrees, abduction right hip 80 degrees left hip 60 degrees, adduction right hip 30 degrees left hip 30 degrees, external rotation right hip 80 degrees   left hip 60 degrees, internal rotation right hip 20 degrees and left hip 10 degrees.\par \par Has more feeling of stiffness in her left hip with some motions than pain.\par \par \par KNEE EXAMINATION\par \par Motion\par Right Knee has 0 to 135 degrees of motion with good medial  lateral and anterior posterior stability.  There is no major effusion.  There is no Baker's cyst.  There is no significant patellofemoral crepitus.  The patient has satisfactory strength across the knee.               \par Left  Knee   has 0 to  125 degrees of motion with good medial lateral and anterior posterior stability.  There is no major effusion there is no Baker's cyst.  There is patellofemoral crepitus.  He has pain with palpation over the medial joint line and pain with motion and rotation at the medial joint line she has a slight varus attitude to her left knee. The patient guards the strength in her left knee.\par \par Ankle and foot examination\par Of the ankle has normal motion.  There is normal ankle stability.  The patient has no major abnormalities of the foot.\par \par \par \par  [de-identified] : 4 views of her left knee with an AP of her right knee the right knee shows normal alignment and joint spaces well-maintained.  Her left knee shows bone against bone contact in the medial compartment of the knee on both the standing Urena and the lateral views.  Large peripheral osteophytes at the patellofemoral joint.  Impression severe osteoarthritis of the medial and patellofemoral joint left knee.\par \par X-rays taken in 2019 showed degenerative osteoarthritis in the left hip.

## 2021-10-19 NOTE — DISCUSSION/SUMMARY
[de-identified] : This patient has significant osteoarthritis in her left knee and in the near future could do well considering a total knee replacement.  She also has osteoarthritis in her left hip.  At this time she says she is managing with conservative measures and is having more of a problem with her back which is being treated by  at this time she would like to stay on conservative measures for her hip and knee.  She has been going to physical therapy but she thinks this is increasing her pain and I feel therefore at this time she should stop therapy and do her old general mild exercise routine and ambulation.  I also suggest she bring her weight down.

## 2021-10-25 ENCOUNTER — APPOINTMENT (OUTPATIENT)
Dept: ORTHOPEDIC SURGERY | Facility: CLINIC | Age: 81
End: 2021-10-25
Payer: MEDICARE

## 2021-10-25 VITALS
HEART RATE: 89 BPM | WEIGHT: 200 LBS | DIASTOLIC BLOOD PRESSURE: 83 MMHG | SYSTOLIC BLOOD PRESSURE: 135 MMHG | HEIGHT: 64 IN | BODY MASS INDEX: 34.15 KG/M2

## 2021-10-25 PROCEDURE — 99214 OFFICE O/P EST MOD 30 MIN: CPT

## 2021-10-25 RX ORDER — METHYLPREDNISOLONE 4 MG/1
4 TABLET ORAL
Qty: 1 | Refills: 1 | Status: ACTIVE | COMMUNITY
Start: 2021-10-25 | End: 1900-01-01

## 2022-05-04 ENCOUNTER — APPOINTMENT (OUTPATIENT)
Dept: MRI IMAGING | Facility: CLINIC | Age: 82
End: 2022-05-04
Payer: MEDICARE

## 2022-05-04 ENCOUNTER — OUTPATIENT (OUTPATIENT)
Dept: OUTPATIENT SERVICES | Facility: HOSPITAL | Age: 82
LOS: 1 days | End: 2022-05-04
Payer: MEDICARE

## 2022-05-04 ENCOUNTER — APPOINTMENT (OUTPATIENT)
Dept: ORTHOPEDIC SURGERY | Facility: CLINIC | Age: 82
End: 2022-05-04
Payer: MEDICARE

## 2022-05-04 VITALS
BODY MASS INDEX: 34.15 KG/M2 | WEIGHT: 200 LBS | SYSTOLIC BLOOD PRESSURE: 130 MMHG | HEART RATE: 106 BPM | HEIGHT: 64 IN | DIASTOLIC BLOOD PRESSURE: 77 MMHG

## 2022-05-04 DIAGNOSIS — Z98.890 OTHER SPECIFIED POSTPROCEDURAL STATES: Chronic | ICD-10-CM

## 2022-05-04 DIAGNOSIS — Z98.89 OTHER SPECIFIED POSTPROCEDURAL STATES: Chronic | ICD-10-CM

## 2022-05-04 DIAGNOSIS — M48.07 SPINAL STENOSIS, LUMBOSACRAL REGION: ICD-10-CM

## 2022-05-04 PROCEDURE — G1004: CPT

## 2022-05-04 PROCEDURE — 72148 MRI LUMBAR SPINE W/O DYE: CPT | Mod: ME

## 2022-05-04 PROCEDURE — 99214 OFFICE O/P EST MOD 30 MIN: CPT

## 2022-05-04 PROCEDURE — 72148 MRI LUMBAR SPINE W/O DYE: CPT | Mod: 26,ME

## 2022-05-04 NOTE — REVIEW OF SYSTEMS
[Joint Pain] : joint pain [Joint Stiffness] : joint stiffness [Negative] : Heme/Lymph [[email or web address]] : Lower Back Pain

## 2022-05-04 NOTE — PHYSICAL EXAM
[Normal] : Gait: normal [Nath's Sign] : negative Nath's sign [Pronator Drift] : negative pronator drift [SLR] : negative straight leg raise [de-identified] : 5 out of 5 motor strength, sensation is intact and symmetrical full range of motion flexion extension and rotation, no palpatory tenderness full range of motion of hips knees shoulders and elbows (all four extremities), no atrophy, negative straight leg raise, no pathological reflexes, no swelling, normal ambulation, no apparent distress skin is intact, no palpable lymph nodes, no upper or lower extremity instability, alert and oriented x3 and normal mood. Normal finger-to nose test. Pain over right SI joint.

## 2022-05-04 NOTE — DISCUSSION/SUMMARY
[de-identified] : Right sacroiliitis.\par Discussed all options. \par NSAIDs bother her stomach.\par MRI lumbar.\par F/U after MRI.\par All options discussed and patient involved in decision making process including rest, medicine, home exercise, acupuncture, Chiropractic care, Physical Therapy, Pain management, and last resort surgery. All questions were answered, all alternatives discussed and the patient is in complete agreement with that plan. Follow-up appointment as instructed. Any issues and the patient will call or come in sooner.

## 2022-05-04 NOTE — HISTORY OF PRESENT ILLNESS
[Stable] : stable [de-identified] : 79 year female presents for follow up evaluation of lower back pain. \par She is underwent R SIJ fusion on 3/7/19 with Dr. Simeon. \par PT helped, injections helped. \par Last seen in May for complaints of R SI joint pain, was recommended to do PT at the time. \par Sees Dr. Leggett for TP injections.\jose daniel Had participated with PT x 3 months which she states helped her lower back pain but not the SI joint pain.\jose daniel Presents today for R sided SI joint pain. Had an episode of pain last week where she had radiation of pain to the posterior thigh, it has improved. \par No fever chills sweats nausea vomiting no bowel or bladder dysfunction, no recent weight loss or gain no night pain. This history is in addition to the intake form that I personally reviewed.

## 2022-05-09 ENCOUNTER — APPOINTMENT (OUTPATIENT)
Dept: ORTHOPEDIC SURGERY | Facility: CLINIC | Age: 82
End: 2022-05-09
Payer: MEDICARE

## 2022-05-09 VITALS
HEIGHT: 64 IN | BODY MASS INDEX: 34.15 KG/M2 | WEIGHT: 200 LBS | SYSTOLIC BLOOD PRESSURE: 111 MMHG | DIASTOLIC BLOOD PRESSURE: 55 MMHG | HEART RATE: 114 BPM

## 2022-05-09 PROCEDURE — 20552 NJX 1/MLT TRIGGER POINT 1/2: CPT

## 2022-05-09 PROCEDURE — 99214 OFFICE O/P EST MOD 30 MIN: CPT | Mod: 25

## 2022-05-09 NOTE — HISTORY OF PRESENT ILLNESS
[Stable] : stable [de-identified] : 79 year female presents for follow up evaluation of lower back pain. \par She is underwent R SIJ fusion on 3/7/19 with Dr. Simeon. \par PT helped, injections helped. \jose daniel Last seen in May for complaints of R SI joint pain, was recommended to do PT at the time. \jose daniel Sees Dr. Leggett for TP injections.\jose daniel Had participated with PT x 3 months which she states helped her lower back pain but not the SI joint pain.\jose daniel Presents today with continued R sided SI joint pain. Had an episode of pain last week where she had radiation of pain to the posterior thigh, it has improved. Here to discuss MRI results and further treatment options.\par No fever chills sweats nausea vomiting no bowel or bladder dysfunction, no recent weight loss or gain no night pain. This history is in addition to the intake form that I personally reviewed.

## 2022-05-09 NOTE — PHYSICAL EXAM
[Normal] : Gait: normal [Nath's Sign] : negative Nath's sign [Pronator Drift] : negative pronator drift [SLR] : negative straight leg raise [de-identified] : 5 out of 5 motor strength, sensation is intact and symmetrical full range of motion flexion extension and rotation, no palpatory tenderness full range of motion of hips knees shoulders and elbows (all four extremities), no atrophy, negative straight leg raise, no pathological reflexes, no swelling, normal ambulation, no apparent distress skin is intact, no palpable lymph nodes, no upper or lower extremity instability, alert and oriented x3 and normal mood. Normal finger-to nose test. Pain over right SI joint.

## 2022-05-09 NOTE — DISCUSSION/SUMMARY
[de-identified] : Right sacroiliitis.\par Discussed all options. \par I offered an injection after all risks were explained including allergic reaction to an infection under sterile conditions 1 mg of Depo-Medrol and 2 cc of 1% lidocaine without epinephrine was injected into the painful site. The patient tolerated the procedure well and received significant relief following the injection.\par All options discussed and patient involved in decision making process including rest, medicine, home exercise, acupuncture, Chiropractic care, Physical Therapy, Pain management, and last resort surgery. All questions were answered, all alternatives discussed and the patient is in complete agreement with that plan. Follow-up appointment as instructed. Any issues and the patient will call or come in sooner.

## 2022-05-18 ENCOUNTER — APPOINTMENT (OUTPATIENT)
Dept: CT IMAGING | Facility: IMAGING CENTER | Age: 82
End: 2022-05-18
Payer: MEDICARE

## 2022-05-18 ENCOUNTER — RESULT REVIEW (OUTPATIENT)
Age: 82
End: 2022-05-18

## 2022-05-18 ENCOUNTER — OUTPATIENT (OUTPATIENT)
Dept: OUTPATIENT SERVICES | Facility: HOSPITAL | Age: 82
LOS: 1 days | End: 2022-05-18
Payer: MEDICARE

## 2022-05-18 DIAGNOSIS — Z98.890 OTHER SPECIFIED POSTPROCEDURAL STATES: Chronic | ICD-10-CM

## 2022-05-18 DIAGNOSIS — M48.07 SPINAL STENOSIS, LUMBOSACRAL REGION: ICD-10-CM

## 2022-05-18 DIAGNOSIS — Z98.89 OTHER SPECIFIED POSTPROCEDURAL STATES: Chronic | ICD-10-CM

## 2022-05-18 PROCEDURE — 76377 3D RENDER W/INTRP POSTPROCES: CPT | Mod: 26

## 2022-05-18 PROCEDURE — G1004: CPT

## 2022-05-18 PROCEDURE — 76377 3D RENDER W/INTRP POSTPROCES: CPT

## 2022-05-18 PROCEDURE — 72192 CT PELVIS W/O DYE: CPT | Mod: ME

## 2022-05-18 PROCEDURE — 72192 CT PELVIS W/O DYE: CPT | Mod: 26,ME

## 2022-06-01 ENCOUNTER — APPOINTMENT (OUTPATIENT)
Dept: ORTHOPEDIC SURGERY | Facility: CLINIC | Age: 82
End: 2022-06-01
Payer: MEDICARE

## 2022-06-01 VITALS
SYSTOLIC BLOOD PRESSURE: 143 MMHG | WEIGHT: 200 LBS | HEIGHT: 64 IN | BODY MASS INDEX: 34.15 KG/M2 | DIASTOLIC BLOOD PRESSURE: 83 MMHG | HEART RATE: 90 BPM

## 2022-06-01 PROCEDURE — 99214 OFFICE O/P EST MOD 30 MIN: CPT

## 2022-06-01 RX ORDER — NAPROXEN 500 MG/1
500 TABLET ORAL
Qty: 90 | Refills: 0 | Status: ACTIVE | COMMUNITY
Start: 2022-06-01 | End: 1900-01-01

## 2022-06-13 ENCOUNTER — APPOINTMENT (OUTPATIENT)
Dept: ORTHOPEDIC SURGERY | Facility: CLINIC | Age: 82
End: 2022-06-13
Payer: MEDICARE

## 2022-06-13 VITALS — HEIGHT: 64 IN | BODY MASS INDEX: 34.15 KG/M2 | WEIGHT: 200 LBS

## 2022-06-13 DIAGNOSIS — M54.50 LOW BACK PAIN, UNSPECIFIED: ICD-10-CM

## 2022-06-13 PROCEDURE — 99214 OFFICE O/P EST MOD 30 MIN: CPT | Mod: 25

## 2022-06-13 PROCEDURE — 20552 NJX 1/MLT TRIGGER POINT 1/2: CPT

## 2022-06-13 NOTE — HISTORY OF PRESENT ILLNESS
[Stable] : stable [de-identified] : 81 year female presents for follow up evaluation of lower back pain. \par She last had a Right sacroiliitis depo injection on 5/09/22, which significantly helped. \par She is underwent R SIJ fusion on 3/7/19 with Dr. Simeon. \par PT helped, injections helped.\par Last seen on 6/1/22 for complaints of R SI joint pain, was recommended to take Naproxen at the time, which she says did not help her pain.\par Sees Dr. Leggett for TP injections.\par Had participated with PT x 3 months which she states helped her lower back pain but not the SI joint pain.\par R sided SI joint pain has improved. Pain worsened by walking and prolonged standing.  \par Had MRI in the past and CT in the past.\par Here today for another right SI joint injection.\par \par No fever chills sweats nausea vomiting no bowel or bladder dysfunction, no recent weight loss or gain no night pain. This history is in addition to the intake form that I personally reviewed.

## 2022-06-13 NOTE — PHYSICAL EXAM
[Normal] : Gait: normal [Nath's Sign] : negative Nath's sign [Pronator Drift] : negative pronator drift [SLR] : negative straight leg raise [de-identified] : 5 out of 5 motor strength, sensation is intact and symmetrical full range of motion flexion extension and rotation, no palpatory tenderness full range of motion of hips knees shoulders and elbows (all four extremities), no atrophy, negative straight leg raise, no pathological reflexes, no swelling, normal ambulation, no apparent distress skin is intact, no palpable lymph nodes, no upper or lower extremity instability, alert and oriented x3 and normal mood. Normal finger-to nose test. Pain over right SI joint.  [de-identified] : CT PELVIS BONY ONLY 05/18/2022: \par \par INTERPRETATION: CT OF THE PELVIS\par \par CLINICAL INFORMATION: Persistent pelvic pain. History of prior traumatic trauma with SI joint effusion. Evaluate SI joint effusion.\par TECHNIQUE: Multidetector CT of the pelvis. The study was performed without the use of intravenous or intra-articular contrast. Multiplanar reformats were generated for review. Three dimensional reconstructions were obtained on an independent workstation. The interpretation of these images is included in the body of the report of the main portion of the study.\par \par COMPARISON: Pelvic MRI 9/9/2016.\par \par FINDINGS:\par \par HARDWARE: Patient status post instrumented posterior fusion of the L4 and L5 vertebral bodies using posterior vanessa and pedicle screw fixation. The spinal fusion hardware is intact. There is periprosthetic lucency measuring up to 3 mm surrounding the right L4 pedicle screw consistent with loosening. The remaining pedicle screws are intact.\par The patient is status post surgical fusion of the right sacroiliac joint. The hardware is intact. No periprosthetic lucency to suggest loosening of the SI joint fusion hardware.\par \par BONE: No acute fracture.\par \par JOINTS: Multilevel spondylosis of the lumbar spine. Partial fusion of the right SI joint. Degenerative changes of the bilateral hip joints. No large joint effusion.\par \par SOFT TISSUE: No pelvic free fluid or lymphadenopathy. Vascular calcifications. Muscle bulk is symmetric.\par \par \par IMPRESSION:\par 1. Patient status post surgical fusion of the right sacroiliac joint. No CT evidence of acute hardware complication. 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 Patient status post instrumented posterior fusion of the lumbar spine with loosening of the right L4 pedicle screw.\par \par --- End of Report ---\par

## 2022-06-13 NOTE — DISCUSSION/SUMMARY
[de-identified] : Right sacroiliitis.\par Discussed all options. \par Naprosyn\par I offered an injection after all risks were explained including allergic reaction to an infection under sterile conditions 1 mg of Depo-Medrol and 2 cc of 1% lidocaine without epinephrine was injected into the painful site. The patient tolerated the procedure well and received significant relief following the injection.\par All options discussed and patient involved in decision making process including rest, medicine, home exercise, acupuncture, Chiropractic care, Physical Therapy, Pain management, and last resort surgery. All questions were answered, all alternatives discussed and the patient is in complete agreement with that plan. Follow-up appointment as instructed. Any issues and the patient will call or come in sooner.

## 2022-08-01 ENCOUNTER — APPOINTMENT (OUTPATIENT)
Dept: ORTHOPEDIC SURGERY | Facility: CLINIC | Age: 82
End: 2022-08-01

## 2022-08-01 VITALS — BODY MASS INDEX: 34.15 KG/M2 | HEIGHT: 64 IN | WEIGHT: 200 LBS

## 2022-08-01 DIAGNOSIS — M51.36 OTHER INTERVERTEBRAL DISC DEGENERATION, LUMBAR REGION: ICD-10-CM

## 2022-08-01 PROCEDURE — 20552 NJX 1/MLT TRIGGER POINT 1/2: CPT

## 2022-08-01 PROCEDURE — 99214 OFFICE O/P EST MOD 30 MIN: CPT | Mod: 25

## 2022-08-01 NOTE — DISCUSSION/SUMMARY
[de-identified] : Right sacroiliitis.\par Discussed all options. \par I offered an injection after all risks were explained including allergic reaction to an infection under sterile conditions 1 mg of Depo-Medrol and 2 cc of 1% lidocaine without epinephrine was injected into the painful site. The patient tolerated the procedure well and received significant relief following the injection.\par All options discussed and patient involved in decision making process including rest, medicine, home exercise, acupuncture, Chiropractic care, Physical Therapy, Pain management, and last resort surgery. All questions were answered, all alternatives discussed and the patient is in complete agreement with that plan. Follow-up appointment as instructed. Any issues and the patient will call or come in sooner.  \par

## 2022-08-01 NOTE — HISTORY OF PRESENT ILLNESS
[Stable] : stable [de-identified] : 81 year female presents for follow up evaluation of lower back pain. \par She last had a Right sacroiliitis depo injection on 5/09/22, which significantly helped. \par She is underwent R SIJ fusion on 3/7/19 with Dr. Simeon. \par PT helped, injections helped.\par Last seen on 6/1/22 for complaints of R SI joint pain, was recommended to take Naproxen at the time, which she says did not help her pain.\par Sees Dr. Leggett for TP injections.\par Had participated with PT x 3 months which she states helped her lower back pain but not the SI joint pain.\par R sided SI joint pain has improved. Pain worsened by walking and prolonged standing. \par Had MRI in the past and CT in the past.\par \par \par No fever chills sweats nausea vomiting no bowel or bladder dysfunction, no recent weight loss or gain no night pain. This history is in addition to the intake form that I personally reviewed. \par \par She states the symptoms are stable.

## 2022-08-01 NOTE — PHYSICAL EXAM
[Normal] : Gait: normal [Nath's Sign] : negative Nath's sign [Pronator Drift] : negative pronator drift [SLR] : negative straight leg raise [de-identified] : 5 out of 5 motor strength, sensation is intact and symmetrical full range of motion flexion extension and rotation, no palpatory tenderness full range of motion of hips knees shoulders and elbows (all four extremities), no atrophy, negative straight leg raise, no pathological reflexes, no swelling, normal ambulation, no apparent distress skin is intact, no palpable lymph nodes, no upper or lower extremity instability, alert and oriented x3 and normal mood. Normal finger-to nose test. Pain over right SI joint.

## 2022-12-05 ENCOUNTER — APPOINTMENT (OUTPATIENT)
Dept: ORTHOPEDIC SURGERY | Facility: CLINIC | Age: 82
End: 2022-12-05

## 2022-12-05 DIAGNOSIS — M54.16 RADICULOPATHY, LUMBAR REGION: ICD-10-CM

## 2022-12-05 PROCEDURE — 20610 DRAIN/INJ JOINT/BURSA W/O US: CPT | Mod: LT

## 2022-12-05 PROCEDURE — 99214 OFFICE O/P EST MOD 30 MIN: CPT | Mod: 25

## 2022-12-05 NOTE — PHYSICAL EXAM
[de-identified] : Constitutional - the patient is of normal build and obese with a BMI of 33.4..  The patient remains oriented to person, time, and  place.  Mood is normal. Vital signs as recorded.  The patients gait is a slight limp but she is having some discomfort in her right sacroiliac joint and some more mild discomfort and stiffness in her left leg possibly knee and hi\par \par \par HIP EXAMINATION the abduction and abduction as well as rotation measurements were taken with the hip in flexion.\par \par Motion\par Her hip show flexion right hip 135 degrees left 135 degrees, abduction right hip 80 degrees left hip 60 degrees, adduction right hip 30 degrees left hip 30 degrees, external rotation right hip 80 degrees   left hip 60 degrees, internal rotation right hip 20 degrees and left hip 10 degrees.\par \par Has more feeling of stiffness in her left hip with some motions than pain.\par \par \par KNEE EXAMINATION\par \par Motion\par Right Knee has 0 to 135 degrees of motion with good medial  lateral and anterior posterior stability.  There is no major effusion.  There is no Baker's cyst.  There is no significant patellofemoral crepitus.  The patient has satisfactory strength across the knee.               \par Left  Knee   has 0 to  120 degrees of motion with good medial lateral and anterior posterior stability.  There is no major effusion there is no Baker's cyst.  There is patellofemoral crepitus. She has pain with palpation over the medial joint line and pain with motion and rotation at the medial joint line she has a slight varus attitude to her left knee.  He has discomfort with compression of her patella.  The patient guards the strength in her left knee.\par \par She would like to stay on conservative measures.\par \par She does have some chronic problems with her back and chronic low back syndrome but at this time is managing satisfactorily without any major neurologic changes.\par \par Ankle and foot examination\par Of the ankle has normal motion.  There is normal ankle stability.  The patient has no major abnormalities of the foot.\par \par \par \par

## 2022-12-05 NOTE — HISTORY OF PRESENT ILLNESS
[de-identified] : Ms. GIO CHA is an 82 year female who returns to office complaining of left knee pain.\par She has been experiencing 7 years of left knee pain, which is severe in intensity. The pain substantially limits activities of daily living. Walking tolerance is reduced. Tylenol does not help to improve the pain. She states that she cannot take NSAIDs. She does use a cane. She tried intra-articular cortisone injections over the last 6 years and initially states they did help but now they no longer help. The patient denies any radiation of the pain to the feet and it is not associated with numbness, tingling, or weakness. She tried physical therapy in the past which did not help. \par Patient saw Dr. Underwood for this left knee pain in 2020, who indicated patient as a candidate for left TKR, but patient deferred on having this done at the time.
No Vaccines Administered.

## 2022-12-05 NOTE — PROCEDURE
[de-identified] : Procedure Note:\par \par Anatomic Location: Left knee\par \par Diagnosis:  Arthritis\par \par Procedure:  Injection of Euflexxa 2cc\par \par Lot Number:         [id number]                              expiration Date:    09– 05–2023\par \par Local Spray: Ethyl Chloride.\par \par Skin preparation with Alcohol.\par \par Patient has consented for the procedure.\par \par Injection  through a lateral parapatella approach.\par \par Patient tolerated the procedure well.\par \par Patient instructed to call the office if any reaction, fever, chills, increased erythema or swelling.   [phone].

## 2022-12-05 NOTE — DISCUSSION/SUMMARY
[de-identified] : This patient has significant osteoarthritis in her left knee and in the near future could do well considering a total knee replacement.  She also has osteoarthritis in her left hip.  At this time she says she is managing with conservative measures and is having more of a problem with her back which is being treated by  at this time she would like to stay on conservative measures for her hip and knee.  She has been going to physical therapy but she thinks this is increasing her pain and I feel therefore at this time she should stop therapy and do her old general mild exercise routine and ambulation.  I also suggest she bring her weight down.

## 2022-12-13 ENCOUNTER — APPOINTMENT (OUTPATIENT)
Dept: ORTHOPEDIC SURGERY | Facility: CLINIC | Age: 82
End: 2022-12-13

## 2022-12-13 VITALS — HEIGHT: 64 IN | BODY MASS INDEX: 34.15 KG/M2 | WEIGHT: 200 LBS

## 2022-12-13 PROCEDURE — 20610 DRAIN/INJ JOINT/BURSA W/O US: CPT | Mod: LT

## 2022-12-13 NOTE — HISTORY OF PRESENT ILLNESS
[de-identified] : Patient returns to office to continue her series of Euflexxa injections for the left knee.\par No changes noted to the left knee since her last office visit.

## 2022-12-13 NOTE — DISCUSSION/SUMMARY
[de-identified] : She tolerated the 2nd Euflexxa injection to her left knee well. Return visit in 1 week. In the future she would be a good candidate for left total knee replacement.

## 2022-12-13 NOTE — PHYSICAL EXAM
[de-identified] : Physical exam is unchanged since last office visit. [de-identified] : Procedure Note: 2nd Euflexxa injection\par \par Anatomic Location: Left knee\par \par Diagnosis: Arthritis\par \par Procedure: Injection of Euflexxa 2cc\par \par Lot Number: [id number] expiration Date: 09– 05–2023\par \par Local Spray: Ethyl Chloride.\par \par Skin preparation with Alcohol.\par \par Patient has consented for the procedure.\par \par Injection through a lateral parapatella approach.\par \par Patient tolerated the procedure well.\par \par Patient instructed to call the office if any reaction, fever, chills, increased erythema or swelling. [phone].

## 2022-12-13 NOTE — PROCEDURE
[de-identified] : Procedure Note: Second injection in this series\par \par Anatomic Location: Left knee\par \par Diagnosis:  Arthritis\par \par Procedure:  Injection of Euflexxa 2cc\par \par Lot Number:         [id number]                              expiration Date:    09– 05–2023\par \par Local Spray: Ethyl Chloride.\par \par Skin preparation with Alcohol.\par \par Patient has consented for the procedure.\par \par Injection  through a lateral parapatella approach.\par \par Patient tolerated the procedure well.\par \par Patient instructed to call the office if any reaction, fever, chills, increased erythema or swelling.   [phone].

## 2022-12-20 ENCOUNTER — APPOINTMENT (OUTPATIENT)
Dept: ORTHOPEDIC SURGERY | Facility: CLINIC | Age: 82
End: 2022-12-20

## 2022-12-20 DIAGNOSIS — M17.12 UNILATERAL PRIMARY OSTEOARTHRITIS, LEFT KNEE: ICD-10-CM

## 2022-12-20 PROCEDURE — 20610 DRAIN/INJ JOINT/BURSA W/O US: CPT | Mod: LT

## 2022-12-20 NOTE — HISTORY OF PRESENT ILLNESS
[de-identified] : Patient returns to office to continue her series of Euflexxa injections for the left knee.\par No changes noted to the left knee since her last office visit.

## 2022-12-20 NOTE — DISCUSSION/SUMMARY
[de-identified] : She tolerated the 3rd Euflexxa injection to her left knee well. Return visit in 3-6 months. In the future she would be a good candidate for left total knee replacement.

## 2022-12-20 NOTE — PROCEDURE
[de-identified] : Procedure Note: 3rd Euflexxa injection\par \par Anatomic Location: Left knee\par \par Diagnosis:  Arthritis\par \par Procedure:  Injection of Euflexxa 2cc\par \par Lot Number: [id number]      Expiration Date: 09– 05–2023\par \par Local Spray: Ethyl Chloride.\par \par Skin preparation with Alcohol.\par \par Patient has consented for the procedure.\par \par Injection  through a lateral parapatella approach.\par \par Patient tolerated the procedure well.\par \par Patient instructed to call the office if any reaction, fever, chills, increased erythema or swelling.   [phone].

## 2023-03-15 ENCOUNTER — EMERGENCY (EMERGENCY)
Facility: HOSPITAL | Age: 83
LOS: 1 days | Discharge: HOME | End: 2023-03-15
Attending: STUDENT IN AN ORGANIZED HEALTH CARE EDUCATION/TRAINING PROGRAM | Admitting: STUDENT IN AN ORGANIZED HEALTH CARE EDUCATION/TRAINING PROGRAM
Payer: MEDICARE

## 2023-03-15 VITALS
DIASTOLIC BLOOD PRESSURE: 76 MMHG | HEART RATE: 89 BPM | RESPIRATION RATE: 16 BRPM | SYSTOLIC BLOOD PRESSURE: 116 MMHG | TEMPERATURE: 98 F | OXYGEN SATURATION: 95 %

## 2023-03-15 DIAGNOSIS — Z98.890 OTHER SPECIFIED POSTPROCEDURAL STATES: Chronic | ICD-10-CM

## 2023-03-15 DIAGNOSIS — Z98.89 OTHER SPECIFIED POSTPROCEDURAL STATES: Chronic | ICD-10-CM

## 2023-03-15 LAB
ALBUMIN SERPL ELPH-MCNC: 2.9 G/DL — LOW (ref 3.3–5)
ALP SERPL-CCNC: 78 U/L — SIGNIFICANT CHANGE UP (ref 40–120)
ALT FLD-CCNC: 20 U/L — SIGNIFICANT CHANGE UP (ref 4–33)
ANION GAP SERPL CALC-SCNC: 14 MMOL/L — SIGNIFICANT CHANGE UP (ref 7–14)
APPEARANCE UR: ABNORMAL
APTT BLD: 25.5 SEC — LOW (ref 27–36.3)
AST SERPL-CCNC: 35 U/L — HIGH (ref 4–32)
BACTERIA # UR AUTO: ABNORMAL
BASE EXCESS BLDV CALC-SCNC: 2.1 MMOL/L — SIGNIFICANT CHANGE UP (ref -2–3)
BASOPHILS # BLD AUTO: 0.1 K/UL — SIGNIFICANT CHANGE UP (ref 0–0.2)
BASOPHILS NFR BLD AUTO: 0.8 % — SIGNIFICANT CHANGE UP (ref 0–2)
BILIRUB SERPL-MCNC: 0.6 MG/DL — SIGNIFICANT CHANGE UP (ref 0.2–1.2)
BILIRUB UR-MCNC: NEGATIVE — SIGNIFICANT CHANGE UP
BLOOD GAS VENOUS COMPREHENSIVE RESULT: SIGNIFICANT CHANGE UP
BUN SERPL-MCNC: 34 MG/DL — HIGH (ref 7–23)
CALCIUM SERPL-MCNC: 9.1 MG/DL — SIGNIFICANT CHANGE UP (ref 8.4–10.5)
CHLORIDE BLDV-SCNC: 102 MMOL/L — SIGNIFICANT CHANGE UP (ref 96–108)
CHLORIDE SERPL-SCNC: 101 MMOL/L — SIGNIFICANT CHANGE UP (ref 98–107)
CO2 BLDV-SCNC: 28.6 MMOL/L — HIGH (ref 22–26)
CO2 SERPL-SCNC: 22 MMOL/L — SIGNIFICANT CHANGE UP (ref 22–31)
COLOR SPEC: YELLOW — SIGNIFICANT CHANGE UP
CREAT SERPL-MCNC: 1.57 MG/DL — HIGH (ref 0.5–1.3)
DIFF PNL FLD: ABNORMAL
EGFR: 33 ML/MIN/1.73M2 — LOW
EOSINOPHIL # BLD AUTO: 0.05 K/UL — SIGNIFICANT CHANGE UP (ref 0–0.5)
EOSINOPHIL NFR BLD AUTO: 0.4 % — SIGNIFICANT CHANGE UP (ref 0–6)
EPI CELLS # UR: 2 /HPF — SIGNIFICANT CHANGE UP (ref 0–5)
FLUAV AG NPH QL: SIGNIFICANT CHANGE UP
FLUBV AG NPH QL: SIGNIFICANT CHANGE UP
GAS PNL BLDV: 134 MMOL/L — LOW (ref 136–145)
GAS PNL BLDV: SIGNIFICANT CHANGE UP
GLUCOSE BLDV-MCNC: 134 MG/DL — HIGH (ref 70–99)
GLUCOSE SERPL-MCNC: 126 MG/DL — HIGH (ref 70–99)
GLUCOSE UR QL: NEGATIVE — SIGNIFICANT CHANGE UP
HCO3 BLDV-SCNC: 27 MMOL/L — SIGNIFICANT CHANGE UP (ref 22–29)
HCT VFR BLD CALC: 43.7 % — SIGNIFICANT CHANGE UP (ref 34.5–45)
HCT VFR BLDA CALC: 52 % — HIGH (ref 34.5–46.5)
HGB BLD CALC-MCNC: 17.3 G/DL — HIGH (ref 11.7–16.1)
HGB BLD-MCNC: 14.3 G/DL — SIGNIFICANT CHANGE UP (ref 11.5–15.5)
HYALINE CASTS # UR AUTO: 2 /LPF — SIGNIFICANT CHANGE UP (ref 0–7)
IANC: 9.21 K/UL — HIGH (ref 1.8–7.4)
IMM GRANULOCYTES NFR BLD AUTO: 3.8 % — HIGH (ref 0–0.9)
INR BLD: 1.22 RATIO — HIGH (ref 0.88–1.16)
KETONES UR-MCNC: NEGATIVE — SIGNIFICANT CHANGE UP
LACTATE BLDV-MCNC: 2 MMOL/L — SIGNIFICANT CHANGE UP (ref 0.5–2)
LEUKOCYTE ESTERASE UR-ACNC: ABNORMAL
LIDOCAIN IGE QN: 21 U/L — SIGNIFICANT CHANGE UP (ref 7–60)
LYMPHOCYTES # BLD AUTO: 1.58 K/UL — SIGNIFICANT CHANGE UP (ref 1–3.3)
LYMPHOCYTES # BLD AUTO: 12.7 % — LOW (ref 13–44)
MCHC RBC-ENTMCNC: 32 PG — SIGNIFICANT CHANGE UP (ref 27–34)
MCHC RBC-ENTMCNC: 32.7 GM/DL — SIGNIFICANT CHANGE UP (ref 32–36)
MCV RBC AUTO: 97.8 FL — SIGNIFICANT CHANGE UP (ref 80–100)
MONOCYTES # BLD AUTO: 1.06 K/UL — HIGH (ref 0–0.9)
MONOCYTES NFR BLD AUTO: 8.5 % — SIGNIFICANT CHANGE UP (ref 2–14)
NEUTROPHILS # BLD AUTO: 9.21 K/UL — HIGH (ref 1.8–7.4)
NEUTROPHILS NFR BLD AUTO: 73.8 % — SIGNIFICANT CHANGE UP (ref 43–77)
NITRITE UR-MCNC: POSITIVE
NRBC # BLD: 0 /100 WBCS — SIGNIFICANT CHANGE UP (ref 0–0)
NRBC # FLD: 0 K/UL — SIGNIFICANT CHANGE UP (ref 0–0)
PCO2 BLDV: 43 MMHG — SIGNIFICANT CHANGE UP (ref 39–52)
PH BLDV: 7.41 — SIGNIFICANT CHANGE UP (ref 7.32–7.43)
PH UR: 6 — SIGNIFICANT CHANGE UP (ref 5–8)
PLATELET # BLD AUTO: 296 K/UL — SIGNIFICANT CHANGE UP (ref 150–400)
PO2 BLDV: 30 MMHG — SIGNIFICANT CHANGE UP (ref 25–45)
POTASSIUM BLDV-SCNC: 2.8 MMOL/L — CRITICAL LOW (ref 3.5–5.1)
POTASSIUM SERPL-MCNC: 3.3 MMOL/L — LOW (ref 3.5–5.3)
POTASSIUM SERPL-SCNC: 3.3 MMOL/L — LOW (ref 3.5–5.3)
PROT SERPL-MCNC: 6.6 G/DL — SIGNIFICANT CHANGE UP (ref 6–8.3)
PROT UR-MCNC: ABNORMAL
PROTHROM AB SERPL-ACNC: 14.2 SEC — HIGH (ref 10.5–13.4)
RBC # BLD: 4.47 M/UL — SIGNIFICANT CHANGE UP (ref 3.8–5.2)
RBC # FLD: 14.9 % — HIGH (ref 10.3–14.5)
RBC CASTS # UR COMP ASSIST: 11 /HPF — HIGH (ref 0–4)
RSV RNA NPH QL NAA+NON-PROBE: SIGNIFICANT CHANGE UP
SAO2 % BLDV: 49.8 % — LOW (ref 67–88)
SARS-COV-2 RNA SPEC QL NAA+PROBE: SIGNIFICANT CHANGE UP
SODIUM SERPL-SCNC: 137 MMOL/L — SIGNIFICANT CHANGE UP (ref 135–145)
SP GR SPEC: 1.02 — SIGNIFICANT CHANGE UP (ref 1.01–1.05)
TROPONIN T, HIGH SENSITIVITY RESULT: 39 NG/L — SIGNIFICANT CHANGE UP
TROPONIN T, HIGH SENSITIVITY RESULT: 41 NG/L — SIGNIFICANT CHANGE UP
UROBILINOGEN FLD QL: SIGNIFICANT CHANGE UP
WBC # BLD: 12.48 K/UL — HIGH (ref 3.8–10.5)
WBC # FLD AUTO: 12.48 K/UL — HIGH (ref 3.8–10.5)
WBC UR QL: 102 /HPF — HIGH (ref 0–5)

## 2023-03-15 PROCEDURE — 71045 X-RAY EXAM CHEST 1 VIEW: CPT | Mod: 26

## 2023-03-15 PROCEDURE — 99285 EMERGENCY DEPT VISIT HI MDM: CPT | Mod: GC

## 2023-03-15 PROCEDURE — 74177 CT ABD & PELVIS W/CONTRAST: CPT | Mod: 26,MA

## 2023-03-15 RX ORDER — POTASSIUM CHLORIDE 20 MEQ
10 PACKET (EA) ORAL
Refills: 0 | Status: COMPLETED | OUTPATIENT
Start: 2023-03-15 | End: 2023-03-16

## 2023-03-15 RX ORDER — CEFTRIAXONE 500 MG/1
1000 INJECTION, POWDER, FOR SOLUTION INTRAMUSCULAR; INTRAVENOUS ONCE
Refills: 0 | Status: COMPLETED | OUTPATIENT
Start: 2023-03-15 | End: 2023-03-16

## 2023-03-15 RX ORDER — SODIUM CHLORIDE 9 MG/ML
1000 INJECTION INTRAMUSCULAR; INTRAVENOUS; SUBCUTANEOUS ONCE
Refills: 0 | Status: COMPLETED | OUTPATIENT
Start: 2023-03-15 | End: 2023-03-15

## 2023-03-15 RX ORDER — FAMOTIDINE 10 MG/ML
20 INJECTION INTRAVENOUS ONCE
Refills: 0 | Status: COMPLETED | OUTPATIENT
Start: 2023-03-15 | End: 2023-03-15

## 2023-03-15 RX ORDER — SODIUM CHLORIDE 9 MG/ML
1000 INJECTION, SOLUTION INTRAVENOUS ONCE
Refills: 0 | Status: DISCONTINUED | OUTPATIENT
Start: 2023-03-15 | End: 2023-03-19

## 2023-03-15 RX ORDER — POTASSIUM CHLORIDE 20 MEQ
40 PACKET (EA) ORAL ONCE
Refills: 0 | Status: COMPLETED | OUTPATIENT
Start: 2023-03-15 | End: 2023-03-15

## 2023-03-15 RX ADMIN — Medication 100 MILLIEQUIVALENT(S): at 22:35

## 2023-03-15 RX ADMIN — Medication 30 MILLILITER(S): at 21:15

## 2023-03-15 RX ADMIN — SODIUM CHLORIDE 1000 MILLILITER(S): 9 INJECTION INTRAMUSCULAR; INTRAVENOUS; SUBCUTANEOUS at 21:15

## 2023-03-15 RX ADMIN — Medication 40 MILLIEQUIVALENT(S): at 22:34

## 2023-03-15 RX ADMIN — Medication 100 MILLIEQUIVALENT(S): at 23:58

## 2023-03-15 RX ADMIN — FAMOTIDINE 20 MILLIGRAM(S): 10 INJECTION INTRAVENOUS at 21:15

## 2023-03-15 NOTE — ED ADULT NURSE NOTE - CHIEF COMPLAINT QUOTE
fevers, diarrhea, and decreased PO intake x 1 week. Endorsing generalized weakness. Family states pt had 12lb weight loss x 1 week. Afebrile at this time. Hx of colitis

## 2023-03-15 NOTE — ED PROVIDER NOTE - NSICDXPASTSURGICALHX_GEN_ALL_CORE_FT
PAST SURGICAL HISTORY:  H/O lithotripsy 2015 - unsuccessful; passed stone on own 1/2018    S/P lumbar laminectomy w/fusion 6/2014    S/P wrist surgery tendonitis- bilaterally

## 2023-03-15 NOTE — ED PROVIDER NOTE - OBJECTIVE STATEMENT
82-year-old female past medical history of lymphocytic colitis, hypothyroidism presents ED complaining of 1 week of subjective fever and chills, decreased p.o. intake, nausea with 1 episode of vomiting and diarrhea, mild upper abdominal discomfort.  Patient states today is the first day without subjective fever or chills but feels generally weak prompting ED visit today.  Denies chest pain, shortness of breath, dysuria.

## 2023-03-15 NOTE — ED PROVIDER NOTE - NSICDXPASTMEDICALHX_GEN_ALL_CORE_FT
PAST MEDICAL HISTORY:  Acquired spondylolisthesis     Back pain, lumbosacral prior to spinal fusion    De Quervain's tenosynovitis, right wrist s/p steroid injections x2( last dose 1 week ago    Eczema legs    Hypothyroidism     Latex allergy     Lymphocytic colitis     Obesity     Sacroiliitis     Tendonitis thumb

## 2023-03-15 NOTE — ED PROVIDER NOTE - ATTENDING CONTRIBUTION TO CARE
82-year-old female with history of colitis and hyper thyroidism presents with 1 week of subjective fevers and weight loss.  Patient states that since last Wednesday has been having sweats and chills and generalized weakness.  States that she has not been having any appetite or eating since last week and has had a 12 pound weight loss.  Patient states the fevers resolved today but did have diarrhea this morning which also has resolved.  Has slight cough since yesterday which is nonproductive.  Patient states she has been progressively weak and having shortness of breath with exertion.  Patient noted today the weight loss and weakness so came to the ER.  Patient well-appearing has slight upper abdominal tenderness will check labs enclosures chest x-ray UA CT abdomen pelvis and reassess patient currently not febrile or tachycardic

## 2023-03-15 NOTE — ED PROVIDER NOTE - CLINICAL SUMMARY MEDICAL DECISION MAKING FREE TEXT BOX
Michael Baum DO PGY-2: 82-year-old female past medical history of lymphocytic colitis, hypothyroidism presents ED complaining of 1 week of subjective fever and chills, decreased p.o. intake, nausea with 1 episode of vomiting and diarrhea, mild upper abdominal discomfort.  Patient states today is the first day without subjective fever or chills but feels generally weak prompting ED visit today.  Denies chest pain, shortness of breath, dysuria. Patient with mild upper abdominal tenderness, dry mucous membranes, vital signs stable otherwise nonfocal exam.  Differential includes not limited to viral gastroenteritis, colitis, UTI low suspicion for pneumonia, cardiac etiology.  Plan for screening labs, chest x-ray, CT abdomen/pelvis, urine.  Reassess

## 2023-03-15 NOTE — ED PROVIDER NOTE - PROGRESS NOTE DETAILS
Michael Baum, DO PGY-2: Labs showing positive UA, CT showing concern for pyelo-, chest x-ray and other labs nonactionable.  Shared decision making with patient, patient like to be admitted for antibiotics further hydration and not feeling safe to go home to care for herself currently.  We will admit patient to medicine. Michael Baum, DO PGY-2: Labs showing positive UA, CT showing concern for pyelo, hypoK which was repleted, chest x-ray and other labs nonactionable.  Shared decision making with patient, patient like to be admitted for antibiotics further hydration and not feeling safe to go home to care for herself currently.  We will admit patient to medicine. Michael Baum, DO PGY-2: On reassessment patient would like to be discharged on home antibiotics to follow-up with PMD.  Plan agreed to after shared decision making.  Will give patient repletion's for potassium and a dose of antibiotics prior to discharge.

## 2023-03-15 NOTE — ED PROVIDER NOTE - PHYSICAL EXAMINATION
GEN: Patient awake alert NAD.   HEENT: normocephalic, atraumatic, dry MM  CARDIAC: RRR, S1, S2, no murmur.   PULM: CTA B/L no wheeze, rhonchi, rales.   ABD: soft, mild upper abdominal ttp, ND, no rebound no guarding, no CVA tenderness.   MSK: Moving all extremities, no edema.   NEURO: A&Ox3,  no focal neurological deficits  SKIN: warm, dry, no rash.

## 2023-03-15 NOTE — ED PROVIDER NOTE - PATIENT PORTAL LINK FT
You can access the FollowMyHealth Patient Portal offered by [facility] by registering at the following website: [email or web address]. By joining [employer]’s FollowMyHealth portal, you will also be able to view your health information using other applications (apps) compatible with our system.

## 2023-03-15 NOTE — ED ADULT NURSE NOTE - OBJECTIVE STATEMENT
Pt received in room 16. Pt is a&ox3, breathing spontaneously and nonlabored, no acute distress in appearance. Pt reports having a fever at home for the past week, but hasn't had one since last night. Pt reports feeling general malaise all day today, prompting ER visit. Pt also endorses upper epigastric pain in general abdomen, abdomen is soft/nontender/not distended, pt denies urinary or GI symptoms. No other symptoms endorsed, cough, nausea, vomiting, SOB. IV placed 20G in L ac, labs drawn and sent as per orders. Safety precautions in place and will be maintained, will continue to monitor.

## 2023-03-15 NOTE — ED PROVIDER NOTE - NSFOLLOWUPINSTRUCTIONS_ED_ALL_ED_FT
1.  Please  antibiotics from the pharmacy and take as prescribed.  2.  Please follow-up with your primary care doctor within the next week.  3.  Please keep yourself well-hydrated.    Urinary Tract Infection    A urinary tract infection (UTI) is an infection of any part of the urinary tract, which includes the kidneys, ureters, bladder, and urethra. Risk factors include ignoring your need to urinate, wiping back to front if female, being an uncircumcised male, and having diabetes or a weak immune system. Symptoms include frequent urination, pain or burning with urination, foul smelling urine, cloudy urine, pain in the lower abdomen, blood in the urine, and fever. If you were prescribed an antibiotic medicine, take it as told by your health care provider. Do not stop taking the antibiotic even if you start to feel better.    SEEK IMMEDIATE MEDICAL CARE IF YOU HAVE ANY OF THE FOLLOWING SYMPTOMS: severe back or abdominal pain, fever, inability to keep fluids or medicine down, dizziness/lightheadedness, or a change in mental status.

## 2023-03-15 NOTE — ED PROVIDER NOTE - NS ED ROS FT
GENERAL: + fever, chills  EYES: no vision changes, no discharge.   ENT: no difficulty swallowing or speaking   CARDIAC: no chest pain/pressure, SOB, lower extremity swelling  PULMONARY: no cough, SOB  GI: +abdominal pain, n/v/d  : no dysuria, no hematuria  SKIN: no rashes, no ecchymosis  NEURO: no headache, lightheadedness  MSK: No joint pain, myalgia, weakness.

## 2023-03-16 RX ORDER — CIPROFLOXACIN LACTATE 400MG/40ML
1 VIAL (ML) INTRAVENOUS
Qty: 14 | Refills: 0
Start: 2023-03-16 | End: 2023-03-22

## 2023-03-16 RX ORDER — CEFDINIR 250 MG/5ML
1 POWDER, FOR SUSPENSION ORAL
Qty: 28 | Refills: 0
Start: 2023-03-16 | End: 2023-03-29

## 2023-03-16 RX ADMIN — Medication 100 MILLIEQUIVALENT(S): at 01:42

## 2023-03-16 RX ADMIN — CEFTRIAXONE 100 MILLIGRAM(S): 500 INJECTION, POWDER, FOR SOLUTION INTRAMUSCULAR; INTRAVENOUS at 01:09

## 2023-03-16 NOTE — ED ADULT NURSE REASSESSMENT NOTE - NS ED NURSE REASSESS COMMENT FT1
report received from AM shift RN. pt A&Ox4 respirations even and unlabored completing full sentences. pt sitting in stretcher comfortable at this time, pt offers no new complaints at this time. pt has potassium running per MD orders. stretcher lowest position siderail up. safety maintained.

## 2023-03-17 ENCOUNTER — INPATIENT (INPATIENT)
Facility: HOSPITAL | Age: 83
LOS: 2 days | Discharge: HOME | End: 2023-03-20
Attending: HOSPITALIST | Admitting: HOSPITALIST
Payer: MEDICARE

## 2023-03-17 VITALS
DIASTOLIC BLOOD PRESSURE: 85 MMHG | TEMPERATURE: 98 F | HEART RATE: 75 BPM | SYSTOLIC BLOOD PRESSURE: 137 MMHG | RESPIRATION RATE: 16 BRPM | OXYGEN SATURATION: 96 %

## 2023-03-17 DIAGNOSIS — Z98.890 OTHER SPECIFIED POSTPROCEDURAL STATES: Chronic | ICD-10-CM

## 2023-03-17 DIAGNOSIS — Z98.89 OTHER SPECIFIED POSTPROCEDURAL STATES: Chronic | ICD-10-CM

## 2023-03-17 LAB
ALBUMIN SERPL ELPH-MCNC: 3 G/DL — LOW (ref 3.3–5)
ALP SERPL-CCNC: 62 U/L — SIGNIFICANT CHANGE UP (ref 40–120)
ALT FLD-CCNC: 24 U/L — SIGNIFICANT CHANGE UP (ref 4–33)
ANION GAP SERPL CALC-SCNC: 15 MMOL/L — HIGH (ref 7–14)
APTT BLD: 26.1 SEC — LOW (ref 27–36.3)
AST SERPL-CCNC: 37 U/L — HIGH (ref 4–32)
B PERT DNA SPEC QL NAA+PROBE: SIGNIFICANT CHANGE UP
B PERT+PARAPERT DNA PNL SPEC NAA+PROBE: SIGNIFICANT CHANGE UP
BASE EXCESS BLDV CALC-SCNC: 0 MMOL/L — SIGNIFICANT CHANGE UP (ref -2–3)
BASOPHILS # BLD AUTO: 0.05 K/UL — SIGNIFICANT CHANGE UP (ref 0–0.2)
BASOPHILS NFR BLD AUTO: 0.4 % — SIGNIFICANT CHANGE UP (ref 0–2)
BILIRUB SERPL-MCNC: 0.5 MG/DL — SIGNIFICANT CHANGE UP (ref 0.2–1.2)
BLOOD GAS VENOUS COMPREHENSIVE RESULT: SIGNIFICANT CHANGE UP
BORDETELLA PARAPERTUSSIS (RAPRVP): SIGNIFICANT CHANGE UP
BUN SERPL-MCNC: 19 MG/DL — SIGNIFICANT CHANGE UP (ref 7–23)
C PNEUM DNA SPEC QL NAA+PROBE: SIGNIFICANT CHANGE UP
CALCIUM SERPL-MCNC: 8.8 MG/DL — SIGNIFICANT CHANGE UP (ref 8.4–10.5)
CHLORIDE BLDV-SCNC: 103 MMOL/L — SIGNIFICANT CHANGE UP (ref 96–108)
CHLORIDE SERPL-SCNC: 103 MMOL/L — SIGNIFICANT CHANGE UP (ref 98–107)
CO2 BLDV-SCNC: 27.9 MMOL/L — HIGH (ref 22–26)
CO2 SERPL-SCNC: 23 MMOL/L — SIGNIFICANT CHANGE UP (ref 22–31)
CREAT SERPL-MCNC: 1.27 MG/DL — SIGNIFICANT CHANGE UP (ref 0.5–1.3)
E COLI DNA BLD POS QL NAA+NON-PROBE: SIGNIFICANT CHANGE UP
EGFR: 42 ML/MIN/1.73M2 — LOW
EOSINOPHIL # BLD AUTO: 0.16 K/UL — SIGNIFICANT CHANGE UP (ref 0–0.5)
EOSINOPHIL NFR BLD AUTO: 1.3 % — SIGNIFICANT CHANGE UP (ref 0–6)
FLUAV SUBTYP SPEC NAA+PROBE: SIGNIFICANT CHANGE UP
FLUBV RNA SPEC QL NAA+PROBE: SIGNIFICANT CHANGE UP
GAS PNL BLDV: 135 MMOL/L — LOW (ref 136–145)
GAS PNL BLDV: SIGNIFICANT CHANGE UP
GLUCOSE BLDV-MCNC: 106 MG/DL — HIGH (ref 70–99)
GLUCOSE SERPL-MCNC: 104 MG/DL — HIGH (ref 70–99)
GRAM STN FLD: SIGNIFICANT CHANGE UP
HADV DNA SPEC QL NAA+PROBE: SIGNIFICANT CHANGE UP
HCO3 BLDV-SCNC: 26 MMOL/L — SIGNIFICANT CHANGE UP (ref 22–29)
HCOV 229E RNA SPEC QL NAA+PROBE: SIGNIFICANT CHANGE UP
HCOV HKU1 RNA SPEC QL NAA+PROBE: SIGNIFICANT CHANGE UP
HCOV NL63 RNA SPEC QL NAA+PROBE: SIGNIFICANT CHANGE UP
HCOV OC43 RNA SPEC QL NAA+PROBE: SIGNIFICANT CHANGE UP
HCT VFR BLD CALC: 40.2 % — SIGNIFICANT CHANGE UP (ref 34.5–45)
HCT VFR BLDA CALC: 39 % — SIGNIFICANT CHANGE UP (ref 34.5–46.5)
HGB BLD CALC-MCNC: 13.1 G/DL — SIGNIFICANT CHANGE UP (ref 11.7–16.1)
HGB BLD-MCNC: 13.1 G/DL — SIGNIFICANT CHANGE UP (ref 11.5–15.5)
HMPV RNA SPEC QL NAA+PROBE: SIGNIFICANT CHANGE UP
HPIV1 RNA SPEC QL NAA+PROBE: SIGNIFICANT CHANGE UP
HPIV2 RNA SPEC QL NAA+PROBE: SIGNIFICANT CHANGE UP
HPIV3 RNA SPEC QL NAA+PROBE: SIGNIFICANT CHANGE UP
HPIV4 RNA SPEC QL NAA+PROBE: SIGNIFICANT CHANGE UP
IANC: 8.69 K/UL — HIGH (ref 1.8–7.4)
IMM GRANULOCYTES NFR BLD AUTO: 4.3 % — HIGH (ref 0–0.9)
INR BLD: 1.25 RATIO — HIGH (ref 0.88–1.16)
LACTATE BLDV-MCNC: 2 MMOL/L — SIGNIFICANT CHANGE UP (ref 0.5–2)
LYMPHOCYTES # BLD AUTO: 16.5 % — SIGNIFICANT CHANGE UP (ref 13–44)
LYMPHOCYTES # BLD AUTO: 2.05 K/UL — SIGNIFICANT CHANGE UP (ref 1–3.3)
M PNEUMO DNA SPEC QL NAA+PROBE: SIGNIFICANT CHANGE UP
MCHC RBC-ENTMCNC: 31.8 PG — SIGNIFICANT CHANGE UP (ref 27–34)
MCHC RBC-ENTMCNC: 32.6 GM/DL — SIGNIFICANT CHANGE UP (ref 32–36)
MCV RBC AUTO: 97.6 FL — SIGNIFICANT CHANGE UP (ref 80–100)
METHOD TYPE: SIGNIFICANT CHANGE UP
MONOCYTES # BLD AUTO: 0.98 K/UL — HIGH (ref 0–0.9)
MONOCYTES NFR BLD AUTO: 7.9 % — SIGNIFICANT CHANGE UP (ref 2–14)
NEUTROPHILS # BLD AUTO: 8.69 K/UL — HIGH (ref 1.8–7.4)
NEUTROPHILS NFR BLD AUTO: 69.6 % — SIGNIFICANT CHANGE UP (ref 43–77)
NRBC # BLD: 0 /100 WBCS — SIGNIFICANT CHANGE UP (ref 0–0)
NRBC # FLD: 0 K/UL — SIGNIFICANT CHANGE UP (ref 0–0)
PCO2 BLDV: 49 MMHG — SIGNIFICANT CHANGE UP (ref 39–52)
PH BLDV: 7.34 — SIGNIFICANT CHANGE UP (ref 7.32–7.43)
PLATELET # BLD AUTO: 438 K/UL — HIGH (ref 150–400)
PO2 BLDV: 23 MMHG — LOW (ref 25–45)
POTASSIUM BLDV-SCNC: 3.5 MMOL/L — SIGNIFICANT CHANGE UP (ref 3.5–5.1)
POTASSIUM SERPL-MCNC: 3.3 MMOL/L — LOW (ref 3.5–5.3)
POTASSIUM SERPL-SCNC: 3.3 MMOL/L — LOW (ref 3.5–5.3)
PROT SERPL-MCNC: 6.4 G/DL — SIGNIFICANT CHANGE UP (ref 6–8.3)
PROTHROM AB SERPL-ACNC: 14.5 SEC — HIGH (ref 10.5–13.4)
RAPID RVP RESULT: SIGNIFICANT CHANGE UP
RBC # BLD: 4.12 M/UL — SIGNIFICANT CHANGE UP (ref 3.8–5.2)
RBC # FLD: 14.9 % — HIGH (ref 10.3–14.5)
RSV RNA SPEC QL NAA+PROBE: SIGNIFICANT CHANGE UP
RV+EV RNA SPEC QL NAA+PROBE: SIGNIFICANT CHANGE UP
SAO2 % BLDV: 29.6 % — LOW (ref 67–88)
SARS-COV-2 RNA SPEC QL NAA+PROBE: SIGNIFICANT CHANGE UP
SODIUM SERPL-SCNC: 141 MMOL/L — SIGNIFICANT CHANGE UP (ref 135–145)
WBC # BLD: 12.46 K/UL — HIGH (ref 3.8–10.5)
WBC # FLD AUTO: 12.46 K/UL — HIGH (ref 3.8–10.5)

## 2023-03-17 PROCEDURE — 99285 EMERGENCY DEPT VISIT HI MDM: CPT

## 2023-03-17 RX ORDER — CEFTRIAXONE 500 MG/1
1000 INJECTION, POWDER, FOR SOLUTION INTRAMUSCULAR; INTRAVENOUS ONCE
Refills: 0 | Status: COMPLETED | OUTPATIENT
Start: 2023-03-17 | End: 2023-03-17

## 2023-03-17 RX ADMIN — CEFTRIAXONE 100 MILLIGRAM(S): 500 INJECTION, POWDER, FOR SOLUTION INTRAMUSCULAR; INTRAVENOUS at 21:19

## 2023-03-17 NOTE — ED POST DISCHARGE NOTE - RESULT SUMMARY
Received call from central lab blood culture from 3/15 growing gram negative rods, speciation pending. Spoke with patient and she will return to ER immediately for further evaluation and IV antibiotics. Patient expressed understanding of the results and plan.

## 2023-03-17 NOTE — ED ADULT NURSE NOTE - CHIEF COMPLAINT QUOTE
pt was seen in [facility] ED Wednesday for fever, had blood cultures done. called back today for positive blood cultures. reports no energy/ appetite. no chest pain, sob, fever, headache. pt in no distress.

## 2023-03-17 NOTE — ED PROVIDER NOTE - PHYSICAL EXAMINATION
PHYSICAL EXAM:  CONSTITUTIONAL: Well appearing, very pleasant, awake, alert, oriented to person, place, time/situation and in no apparent distress.  HEAD: Atraumatic  EYES: Clear bilaterally, pupils equal, round and reactive to light.  ENMT: Airway patent, Nasal mucosa clear. Mouth with normal mucosa. Uvula is midline.   CARDIAC: Normal rate, regular rhythm. +S1/S2. No murmurs, rubs or gallops.  RESPIRATORY: Breathing unlabored. Breath sounds clear and equal bilaterally.  ABDOMEN:  Soft, nontender, nondistended. No rebound tenderness or guarding.  NEUROLOGICAL: Alert and oriented, no focal deficits, no motor or sensory deficits.  MSK: No clubbing, cyanosis, or edema. Full range of motion of all extremities.   SKIN: Skin warm and dry. No evidence of rashes or lesions.

## 2023-03-17 NOTE — ED ADULT TRIAGE NOTE - CHIEF COMPLAINT QUOTE
pt was seen in [facility] ED Wednesday for fever, had blood cultures done. called back today for positive blood cultures. reports no energy/ appetite. no chest pain, sob, fever, headache. pt in no distress.

## 2023-03-17 NOTE — ED PROVIDER NOTE - CLINICAL SUMMARY MEDICAL DECISION MAKING FREE TEXT BOX
Nakita Quiñones DO PGY-2  82-year-old female past medical history of lymphocytic colitis, hypothyroidism presents ED after blood cultures during ED visit grew e coli after being diagnosed with pyelonephritis. Pt nontoxic appearing, appears well, hemodynamically stable and afebrile. Concern for urosepsis. Will give IV ceftriaxone, obtain labs/repeat blood cultures, urine, urine cultures and patient will require admission.

## 2023-03-17 NOTE — ED PROVIDER NOTE - ATTENDING CONTRIBUTION TO CARE
82-year-old female past medical history of lymphocytic colitis, hypothyroidism presents ED after blood cultures during ED visit grew e coli. Patient was recently seen and diagnosed with pyelonephritis, was discharged home after shared decision making. Patient had been experiencing 1 week of subjective fever and chills, decreased PO intake, nausea with 1 episode of vomiting and diarrhea, mild upper abdominal discomfort. Patient has been feeling well since discharge, no fevers or chills at home.

## 2023-03-17 NOTE — ED ADULT NURSE NOTE - OBJECTIVE STATEMENT
Jessie RN- Received pt in spot 24A. pt A&OX3, ambulatory. pt states was seen at [facility] ED Wednesday and was called back for positive blood cultures. pt was seen for fevers. pt reports feeling generalized fatigue but denies any other complaints. no pain, sob, cough, fever, headache, dizziness, n/v. pt in no distress. respirations are equal and nonlabored , no respiratory distress noted. 20 gauge iv placed in the left ac, labs sent. pt stable, safety maintained. side rails up. will endorse report to primary RN for further plan

## 2023-03-18 DIAGNOSIS — Z29.9 ENCOUNTER FOR PROPHYLACTIC MEASURES, UNSPECIFIED: ICD-10-CM

## 2023-03-18 DIAGNOSIS — N12 TUBULO-INTERSTITIAL NEPHRITIS, NOT SPECIFIED AS ACUTE OR CHRONIC: ICD-10-CM

## 2023-03-18 DIAGNOSIS — R78.81 BACTEREMIA: ICD-10-CM

## 2023-03-18 DIAGNOSIS — K52.832 LYMPHOCYTIC COLITIS: ICD-10-CM

## 2023-03-18 DIAGNOSIS — E03.9 HYPOTHYROIDISM, UNSPECIFIED: ICD-10-CM

## 2023-03-18 LAB
-  AMIKACIN: SIGNIFICANT CHANGE UP
-  AMOXICILLIN/CLAVULANIC ACID: SIGNIFICANT CHANGE UP
-  AMPICILLIN/SULBACTAM: SIGNIFICANT CHANGE UP
-  AMPICILLIN: SIGNIFICANT CHANGE UP
-  AZTREONAM: SIGNIFICANT CHANGE UP
-  CEFAZOLIN: SIGNIFICANT CHANGE UP
-  CEFEPIME: SIGNIFICANT CHANGE UP
-  CEFOXITIN: SIGNIFICANT CHANGE UP
-  CEFTRIAXONE: SIGNIFICANT CHANGE UP
-  CEFUROXIME: SIGNIFICANT CHANGE UP
-  CIPROFLOXACIN: SIGNIFICANT CHANGE UP
-  ERTAPENEM: SIGNIFICANT CHANGE UP
-  GENTAMICIN: SIGNIFICANT CHANGE UP
-  IMIPENEM: SIGNIFICANT CHANGE UP
-  LEVOFLOXACIN: SIGNIFICANT CHANGE UP
-  MEROPENEM: SIGNIFICANT CHANGE UP
-  NITROFURANTOIN: SIGNIFICANT CHANGE UP
-  PIPERACILLIN/TAZOBACTAM: SIGNIFICANT CHANGE UP
-  TOBRAMYCIN: SIGNIFICANT CHANGE UP
-  TRIMETHOPRIM/SULFAMETHOXAZOLE: SIGNIFICANT CHANGE UP
ANION GAP SERPL CALC-SCNC: 14 MMOL/L — SIGNIFICANT CHANGE UP (ref 7–14)
APPEARANCE UR: CLEAR — SIGNIFICANT CHANGE UP
BACTERIA # UR AUTO: ABNORMAL
BASOPHILS # BLD AUTO: 0.06 K/UL — SIGNIFICANT CHANGE UP (ref 0–0.2)
BASOPHILS NFR BLD AUTO: 0.6 % — SIGNIFICANT CHANGE UP (ref 0–2)
BILIRUB UR-MCNC: NEGATIVE — SIGNIFICANT CHANGE UP
BUN SERPL-MCNC: 16 MG/DL — SIGNIFICANT CHANGE UP (ref 7–23)
CALCIUM SERPL-MCNC: 8.2 MG/DL — LOW (ref 8.4–10.5)
CHLORIDE SERPL-SCNC: 105 MMOL/L — SIGNIFICANT CHANGE UP (ref 98–107)
CO2 SERPL-SCNC: 20 MMOL/L — LOW (ref 22–31)
COLOR SPEC: SIGNIFICANT CHANGE UP
CREAT SERPL-MCNC: 1.18 MG/DL — SIGNIFICANT CHANGE UP (ref 0.5–1.3)
CULTURE RESULTS: SIGNIFICANT CHANGE UP
DIFF PNL FLD: NEGATIVE — SIGNIFICANT CHANGE UP
EGFR: 46 ML/MIN/1.73M2 — LOW
EOSINOPHIL # BLD AUTO: 0.14 K/UL — SIGNIFICANT CHANGE UP (ref 0–0.5)
EOSINOPHIL NFR BLD AUTO: 1.4 % — SIGNIFICANT CHANGE UP (ref 0–6)
EPI CELLS # UR: 5 /HPF — SIGNIFICANT CHANGE UP (ref 0–5)
GLUCOSE SERPL-MCNC: 103 MG/DL — HIGH (ref 70–99)
GLUCOSE UR QL: NEGATIVE — SIGNIFICANT CHANGE UP
HCT VFR BLD CALC: 37.2 % — SIGNIFICANT CHANGE UP (ref 34.5–45)
HGB BLD-MCNC: 11.9 G/DL — SIGNIFICANT CHANGE UP (ref 11.5–15.5)
HYALINE CASTS # UR AUTO: 1 /LPF — SIGNIFICANT CHANGE UP (ref 0–7)
IANC: 7.35 K/UL — SIGNIFICANT CHANGE UP (ref 1.8–7.4)
IMM GRANULOCYTES NFR BLD AUTO: 3.6 % — HIGH (ref 0–0.9)
KETONES UR-MCNC: NEGATIVE — SIGNIFICANT CHANGE UP
LEUKOCYTE ESTERASE UR-ACNC: ABNORMAL
LYMPHOCYTES # BLD AUTO: 1.39 K/UL — SIGNIFICANT CHANGE UP (ref 1–3.3)
LYMPHOCYTES # BLD AUTO: 13.7 % — SIGNIFICANT CHANGE UP (ref 13–44)
MAGNESIUM SERPL-MCNC: 1.9 MG/DL — SIGNIFICANT CHANGE UP (ref 1.6–2.6)
MCHC RBC-ENTMCNC: 31.4 PG — SIGNIFICANT CHANGE UP (ref 27–34)
MCHC RBC-ENTMCNC: 32 GM/DL — SIGNIFICANT CHANGE UP (ref 32–36)
MCV RBC AUTO: 98.2 FL — SIGNIFICANT CHANGE UP (ref 80–100)
METHOD TYPE: SIGNIFICANT CHANGE UP
MONOCYTES # BLD AUTO: 0.86 K/UL — SIGNIFICANT CHANGE UP (ref 0–0.9)
MONOCYTES NFR BLD AUTO: 8.5 % — SIGNIFICANT CHANGE UP (ref 2–14)
NEUTROPHILS # BLD AUTO: 7.35 K/UL — SIGNIFICANT CHANGE UP (ref 1.8–7.4)
NEUTROPHILS NFR BLD AUTO: 72.2 % — SIGNIFICANT CHANGE UP (ref 43–77)
NITRITE UR-MCNC: NEGATIVE — SIGNIFICANT CHANGE UP
NRBC # BLD: 0 /100 WBCS — SIGNIFICANT CHANGE UP (ref 0–0)
NRBC # FLD: 0 K/UL — SIGNIFICANT CHANGE UP (ref 0–0)
ORGANISM # SPEC MICROSCOPIC CNT: SIGNIFICANT CHANGE UP
ORGANISM # SPEC MICROSCOPIC CNT: SIGNIFICANT CHANGE UP
PH UR: 6 — SIGNIFICANT CHANGE UP (ref 5–8)
PHOSPHATE SERPL-MCNC: 2.6 MG/DL — SIGNIFICANT CHANGE UP (ref 2.5–4.5)
PLATELET # BLD AUTO: 379 K/UL — SIGNIFICANT CHANGE UP (ref 150–400)
POTASSIUM SERPL-MCNC: 3.3 MMOL/L — LOW (ref 3.5–5.3)
POTASSIUM SERPL-SCNC: 3.3 MMOL/L — LOW (ref 3.5–5.3)
PROT UR-MCNC: ABNORMAL
RBC # BLD: 3.79 M/UL — LOW (ref 3.8–5.2)
RBC # FLD: 14.9 % — HIGH (ref 10.3–14.5)
RBC CASTS # UR COMP ASSIST: 3 /HPF — SIGNIFICANT CHANGE UP (ref 0–4)
SODIUM SERPL-SCNC: 139 MMOL/L — SIGNIFICANT CHANGE UP (ref 135–145)
SP GR SPEC: 1.01 — SIGNIFICANT CHANGE UP (ref 1.01–1.05)
SPECIMEN SOURCE: SIGNIFICANT CHANGE UP
UROBILINOGEN FLD QL: SIGNIFICANT CHANGE UP
WBC # BLD: 10.17 K/UL — SIGNIFICANT CHANGE UP (ref 3.8–10.5)
WBC # FLD AUTO: 10.17 K/UL — SIGNIFICANT CHANGE UP (ref 3.8–10.5)
WBC UR QL: 13 /HPF — HIGH (ref 0–5)

## 2023-03-18 PROCEDURE — 99223 1ST HOSP IP/OBS HIGH 75: CPT

## 2023-03-18 RX ORDER — POTASSIUM CHLORIDE 20 MEQ
10 PACKET (EA) ORAL
Refills: 0 | Status: COMPLETED | OUTPATIENT
Start: 2023-03-18 | End: 2023-03-18

## 2023-03-18 RX ORDER — ENOXAPARIN SODIUM 100 MG/ML
40 INJECTION SUBCUTANEOUS EVERY 24 HOURS
Refills: 0 | Status: DISCONTINUED | OUTPATIENT
Start: 2023-03-18 | End: 2023-03-20

## 2023-03-18 RX ORDER — LANOLIN ALCOHOL/MO/W.PET/CERES
3 CREAM (GRAM) TOPICAL AT BEDTIME
Refills: 0 | Status: DISCONTINUED | OUTPATIENT
Start: 2023-03-18 | End: 2023-03-20

## 2023-03-18 RX ORDER — MESALAMINE 400 MG
1 TABLET, DELAYED RELEASE (ENTERIC COATED) ORAL
Qty: 0 | Refills: 0 | DISCHARGE

## 2023-03-18 RX ORDER — LEVOTHYROXINE SODIUM 125 MCG
1 TABLET ORAL
Qty: 0 | Refills: 0 | DISCHARGE

## 2023-03-18 RX ORDER — ESOMEPRAZOLE MAGNESIUM 40 MG/1
1 CAPSULE, DELAYED RELEASE ORAL
Qty: 0 | Refills: 0 | DISCHARGE

## 2023-03-18 RX ORDER — PANTOPRAZOLE SODIUM 20 MG/1
40 TABLET, DELAYED RELEASE ORAL
Refills: 0 | Status: DISCONTINUED | OUTPATIENT
Start: 2023-03-18 | End: 2023-03-20

## 2023-03-18 RX ORDER — CEFTRIAXONE 500 MG/1
2000 INJECTION, POWDER, FOR SOLUTION INTRAMUSCULAR; INTRAVENOUS EVERY 24 HOURS
Refills: 0 | Status: DISCONTINUED | OUTPATIENT
Start: 2023-03-18 | End: 2023-03-20

## 2023-03-18 RX ORDER — CELECOXIB 200 MG/1
1 CAPSULE ORAL
Qty: 0 | Refills: 0 | DISCHARGE

## 2023-03-18 RX ORDER — LEVOTHYROXINE SODIUM 125 MCG
75 TABLET ORAL
Refills: 0 | Status: DISCONTINUED | OUTPATIENT
Start: 2023-03-18 | End: 2023-03-20

## 2023-03-18 RX ORDER — ACETAMINOPHEN 500 MG
650 TABLET ORAL EVERY 6 HOURS
Refills: 0 | Status: DISCONTINUED | OUTPATIENT
Start: 2023-03-18 | End: 2023-03-20

## 2023-03-18 RX ORDER — MESALAMINE 400 MG
2 TABLET, DELAYED RELEASE (ENTERIC COATED) ORAL
Qty: 0 | Refills: 0 | DISCHARGE

## 2023-03-18 RX ORDER — POTASSIUM CHLORIDE 20 MEQ
40 PACKET (EA) ORAL EVERY 6 HOURS
Refills: 0 | Status: COMPLETED | OUTPATIENT
Start: 2023-03-18 | End: 2023-03-18

## 2023-03-18 RX ORDER — CEFTRIAXONE 500 MG/1
1000 INJECTION, POWDER, FOR SOLUTION INTRAMUSCULAR; INTRAVENOUS ONCE
Refills: 0 | Status: COMPLETED | OUTPATIENT
Start: 2023-03-18 | End: 2023-03-18

## 2023-03-18 RX ORDER — ACETAMINOPHEN 500 MG
2 TABLET ORAL
Qty: 0 | Refills: 0 | DISCHARGE

## 2023-03-18 RX ORDER — LEVOTHYROXINE SODIUM 125 MCG
50 TABLET ORAL
Refills: 0 | Status: DISCONTINUED | OUTPATIENT
Start: 2023-03-18 | End: 2023-03-20

## 2023-03-18 RX ORDER — L.ACIDOPH/B.ANIMALIS/B.LONGUM 15B CELL
1 CAPSULE ORAL
Qty: 0 | Refills: 0 | DISCHARGE

## 2023-03-18 RX ADMIN — Medication 40 MILLIEQUIVALENT(S): at 13:19

## 2023-03-18 RX ADMIN — CEFTRIAXONE 100 MILLIGRAM(S): 500 INJECTION, POWDER, FOR SOLUTION INTRAMUSCULAR; INTRAVENOUS at 22:27

## 2023-03-18 RX ADMIN — Medication 100 MILLIEQUIVALENT(S): at 05:27

## 2023-03-18 RX ADMIN — Medication 100 MILLIEQUIVALENT(S): at 03:27

## 2023-03-18 RX ADMIN — Medication 40 MILLIEQUIVALENT(S): at 17:37

## 2023-03-18 RX ADMIN — Medication 50 MICROGRAM(S): at 05:27

## 2023-03-18 RX ADMIN — CEFTRIAXONE 100 MILLIGRAM(S): 500 INJECTION, POWDER, FOR SOLUTION INTRAMUSCULAR; INTRAVENOUS at 01:09

## 2023-03-18 RX ADMIN — PANTOPRAZOLE SODIUM 40 MILLIGRAM(S): 20 TABLET, DELAYED RELEASE ORAL at 06:26

## 2023-03-18 NOTE — ED ADULT NURSE REASSESSMENT NOTE - NS ED NURSE REASSESS COMMENT FT1
Report given to [facility] 2 RN. Pt in no apparent distress. Denies any discomfort. Urine sent as per order[facility]

## 2023-03-18 NOTE — H&P ADULT - HISTORY OF PRESENT ILLNESS
82-year-old female past medical history of lymphocytic colitis, hypothyroidism presents ED after blood cultures during ED visit grew e coli. Patient was recently seen and diagnosed with pyelonephritis.    In ED, vitals T 98, HR 75, /85, RR 16, O2 sat 96% on RA  Labs significant for wbc 12.46, blood cx positive for e. coli  EKG personally reviewed  CXR:  Imaging: CT a/p [facility] 3/15/23: IMPRESSION:  Bilateral renal cortical patchy hypodensities which may represent acute   pyelonephritis. Correlation with urinalysis is recommended.    Increased size of hiatal hernia.  ED management: IV ceftriaxone Pt is an 82-year-old female past medical history of lymphocytic colitis, hypothyroidism presents ED for positive blood cultures during ED visit 3/15 grew e coli. Pt was seen in ED on 3/15 and was found to have pylenephritis, pt was discharged of cefdinir however was called to come back to ED for admission for positive bcx. Pt states prior to ED visit on 3/15, she has having fever, chills, fatigue x10 days. She denies having any urinary symptoms, lower back pain, suprapubic pain. She reports decreased PO intake. She denies any chest pain, SOB, cough, abd pain, n/v/d, or HA.       In ED, vitals T 98, HR 75, /85, RR 16, O2 sat 96% on RA  Labs significant for wbc 12.46, blood cx positive for e. coli  EKG personally reviewed from 3/15: sinus rhythm w/ PACs, no acute ischemic changes   CXR from 3/15: clear lungs, no acute infiltrates  Imaging: CT a/p [facility] 3/15/23: IMPRESSION:  Bilateral renal cortical patchy hypodensities which may represent acute   pyelonephritis. Correlation with urinalysis is recommended.    Increased size of hiatal hernia.  ED management: IV ceftriaxone

## 2023-03-18 NOTE — H&P ADULT - TIME BILLING
I have spent a total of greater than 75 minutes time spent to prepare to see the patient, obtaining and reviewing history, physical examination, explaining the diagnosis, prognosis and treatment plan with the patient/family/caregiver. I also have spent the time ordering studies and testing, interpreting results, medicine reconciliation, and documentation as above.

## 2023-03-18 NOTE — H&P ADULT - NSHPLABSRESULTS_GEN_ALL_CORE
.  LABS:                         13.1   12.46 )-----------( 438      ( 17 Mar 2023 20:25 )             40.2     03-17    141  |  103  |  19  ----------------------------<  104<H>  3.3<L>   |  23  |  1.27    Ca    8.8      17 Mar 2023 21:14    TPro  6.4  /  Alb  3.0<L>  /  TBili  0.5  /  DBili  x   /  AST  37<H>  /  ALT  24  /  AlkPhos  62  03-17    PT/INR - ( 17 Mar 2023 20:25 )   PT: 14.5 sec;   INR: 1.25 ratio         PTT - ( 17 Mar 2023 20:25 )  PTT:26.1 sec              RADIOLOGY, EKG & ADDITIONAL TESTS: Reviewed.

## 2023-03-18 NOTE — ED ADULT NURSE REASSESSMENT NOTE - NS ED NURSE REASSESS COMMENT FT1
Break RN note- Patient resting quietly in bed, breathing even and nonlabored. No acute distress. No complaints at this time. Patient to be medicated as ordered.   Safety maintained. Patient stable upon exiting the room.

## 2023-03-18 NOTE — H&P ADULT - PROBLEM SELECTOR PLAN 1
Likely 2/2 to pyelonephritis, urine cx positive for e. coli.   - c/w IV ceftriaxone 2 g QD  - f/u repeat bcx

## 2023-03-18 NOTE — PROGRESS NOTE ADULT - ASSESSMENT
Pt is an 82-year-old female past medical history of lymphocytic colitis, hypothyroidism presents ED for positive blood cultures during ED visit 3/15 grew e coli. Pt admitted for e. coli bacteremia 2/2 pyelo. Pt admitted for IV abx.

## 2023-03-18 NOTE — H&P ADULT - ASSESSMENT
Pt is an 82-year-old female past medical history of lymphocytic colitis, hypothyroidism presents ED for positive blood cultures during ED visit 3/15 grew e coli. Pt was seen in ED on 3/15 and was found to have pylenephritis, pt was discharged of cefdinir however was called to come back to ED for admission for positive bcx.      Pt is an 82-year-old female past medical history of lymphocytic colitis, hypothyroidism presents ED for positive blood cultures during ED visit 3/15 grew e coli. Pt admitted for e. coli bacteremia 2/2 pyelo. Pt admitted for IV abx.

## 2023-03-18 NOTE — CONSULT NOTE ADULT - ASSESSMENT
Pt is an 82-year-old female past medical history of lymphocytic colitis on mesalamine, hypothyroidism presents ED for positive blood cultures during ED visit 3/15 grew e coli.   ecoli bacteremia/gu etiology  leukocytosis- resolved     plan  f/u blood cx sensitivities  surveillance cx pending  urine cx -- sensitivities noted   cont ceftriaxone  will tailor once cx back

## 2023-03-18 NOTE — CONSULT NOTE ADULT - SUBJECTIVE AND OBJECTIVE BOX
[facility], Division of Infectious Diseases  MARISOL Gooden S. Shah, Y. Patel, G. Patrick   [phone]  after hours and weekends [phone]    GIO CHA  82y, Female  [id number]    HPI--    Pt is an 82-year-old female past medical history of lymphocytic colitis on mesalamine, hypothyroidism presents ED for positive blood cultures during ED visit 3/15 grew e coli.   pt states she had been having fever and weakness, she was not hungry or eating much for 10 days   no dysuria, no urgency, no frequency   no cough, no sob, no chest pain no vomiting no head ache  no abd pain,  and Pt was seen in ED on 3/15 and was found to have pyelonephritis , pt was discharged of cefdinir however was called to come back to ED for admission for positive bcx.   Pt states prior to ED visit on 3/15, she has having fever, chills, fatigue x10 days. She denies having any urinary symptoms, lower back pain, suprapubic pain.  no recent procedures or hospital stay  no known sick contacts  baseline she has episodes of loose stool at times       PMH/PSH--  Hypothyroidism  Acquired spondylolisthesis  Tendonitis  Eczema  Colitis  Obesity  De Quervain&#x27;s tenosynovitis, right  Back pain, lumbosacral  Lymphocytic colitis  Sacroiliitis  Latex allergy  Finger deformity  S/P lumbar laminectomy  S/P wrist surgery  H/O lithotripsy        Allergies--pcn      Medications--  Antibiotics: cefTRIAXone   IVPB 2000 milliGRAM(s) IV Intermittent every 24 hours    Immunologic:   Other: acetaminophen     Tablet .. PRN  enoxaparin Injectable  levothyroxine  levothyroxine  melatonin PRN  pantoprazole    Tablet  potassium chloride   Powder      Social History--  EtOH: denies ***  Tobacco: denies ***  Drug Use: denies ***    Family/Marital History--  Family history of bone cancer (Mother)          Travel/Environmental/Occupational History:  retired   Review of Systems:  REVIEW OF SYSTEMS  General: + fever, no chills, no wt loss	  Ophthalmologic: no blurry vision  Respiratory and Thorax: no cough, no dyspnea  Cardiovascular: no chest pain, no palpitations  Gastrointestinal:  no nausea, no vomiting, diarrhea  Genitourinary: no dysuria, no urgency, no frequency	  Musculoskeletal: no myalgias	  Neurological:  no headache	    Physical Exam--  Vital Signs: T(F): 98.1 (03-18-23 @ 12:56), Max: 99 (03-18-23 @ 00:33)  HR: 71 (03-18-23 @ 12:56)  BP: 149/80 (03-18-23 @ 12:56)  RR: 18 (03-18-23 @ 12:56)  SpO2: 97% (03-18-23 @ 12:56)  Wt(kg): --  General: Nontoxic-appearing Female in no acute distress.  HEENT: AT/NC.   Neck: Not rigid. No sense of mass.  Nodes: None palpable.  Lungs: Clear bilaterally without rales, wheezing or rhonchi  Heart: Regular rate and rhythm.  Abdomen: Bowel sounds present and normoactive. Soft. Nondistended. Nontender.   Back: No spinal tenderness. No costovertebral angle tenderness.   Extremities: No cyanosis or clubbing. No edema.   Skin: Warm. Dry. Good turgor. No rash. No vasculitic stigmata.  Psychiatric: Appropriate affect and mood for situation.         Laboratory & Imaging Data--  CBC                        11.9   10.17 )-----------( 379      ( 18 Mar 2023 07:12 )             37.2       Chemistries  03-18    139  |  105  |  16  ----------------------------<  103<H>  3.3<L>   |  20<L>  |  1.18    Ca    8.2<L>      18 Mar 2023 07:12  Phos  2.6     03-18  Mg     1.90     03-18    TPro  6.4  /  Alb  3.0<L>  /  TBili  0.5  /  DBili  x   /  AST  37<H>  /  ALT  24  /  AlkPhos  62  03-17      Culture Data    Culture - Blood (collected 15 Mar 2023 21:50)  Source: .Blood Blood-Peripheral  Gram Stain (17 Mar 2023 15:53):    Growth in aerobic bottle: Gram Negative Rods  Preliminary Report (18 Mar 2023 12:48):    Growth in aerobic bottle: Escherichia coli    ***Blood Panel PCR results on this specimen are available    approximately 3 hours after the Gram stain result.***    Gram stain, PCR, and/or culture results may not always    correspond due to difference in methodologies.    ************************************************************    This PCR assay was performed by multiplex PCR. This    Assay tests for 66 bacterial and resistance gene targets.    Please refer to the [facility] test directory    at [email or web address] for details.  Organism: Blood Culture PCR (17 Mar 2023 18:33)  Organism: Blood Culture PCR (17 Mar 2023 18:33)    Culture - Urine (collected 15 Mar 2023 21:40)  Source: Clean Catch Clean Catch (Midstream)  Final Report (18 Mar 2023 11:30):    >100,000 CFU/ml Escherichia coli  Organism: Escherichia coli (18 Mar 2023 11:30)  Organism: Escherichia coli (18 Mar 2023 11:30)    Culture - Blood (collected 15 Mar 2023 21:10)  Source: .Blood Blood-Peripheral  Preliminary Report (17 Mar 2023 03:01):    No growth to date.      < from: CT Abdomen and Pelvis w/ IV Cont (03.15.23 @ 21:42) >  CONTRAST/COMPLICATIONS:  IV Contrast: Omnipaque 350  90 cc administered   10 cc discarded  Oral Contrast: NONE  Complications: None reported at time of study completion    PROCEDURE:  CT of the Abdomen and Pelvis was performed.  Sagittal and coronal reformats were performed.    FINDINGS:  LOWER CHEST: Coronary artery and aortic valvular calcifications.    LIVER: Steatosis.  BILE DUCTS: Normal caliber.  GALLBLADDER: Within normal limits.  SPLEEN: Within normal limits.  PANCREAS: Within normal limits.  ADRENALS: Within normal limits.  KIDNEYS/URETERS: Bilateral renal cortical patchy hypodensities. No   hydronephrosis. No obstructing renal stone.    BLADDER: Within normal limits.  REPRODUCTIVE ORGANS: Uterine fibroids.    BOWEL: Moderate sized hiatal hernia containing portions of stomach,   increased in size. No bowel obstruction. Appendix is normal.  PERITONEUM: No ascites.  VESSELS: Atherosclerotic changes.  RETROPERITONEUM/LYMPH NODES: No lymphadenopathy.  ABDOMINAL WALL: Within normal limits.  BONES: Degenerative changes. Status post right sacroiliac fixation.   Status post L4-L5 posterior spinal fusion. Anterolisthesis of L4 on L5.    IMPRESSION:  Bilateral renal cortical patchy hypodensities which may represent acute   pyelonephritis. Correlation with urinalysis is recommended.    Increased size of hiatal hernia.    --- End of Report ---    < end of copied text >

## 2023-03-18 NOTE — PATIENT PROFILE ADULT - NSTRANSFERBELONGINGSRESP_GEN_A_NUR
yes Constitutional: +FEVER  Eyes: No visual changes  HEENT: No throat pain  CV: No chest pain  Resp: +SOB , +COUGH  GI: No abd pain, nausea or vomiting  : No dysuria  MSK: No musculoskeletal pain  Skin: No rash  Neuro: No headache

## 2023-03-18 NOTE — H&P ADULT - NSHPPHYSICALEXAM_GEN_ALL_CORE
VITAL SIGNS:  T(C): 37.2 (03-18-23 @ 00:33), Max: 37.2 (03-18-23 @ 00:33)  T(F): 99 (03-18-23 @ 00:33), Max: 99 (03-18-23 @ 00:33)  HR: 67 (03-18-23 @ 00:33) (67 - 75)  BP: 135/73 (03-18-23 @ 00:33) (135/73 - 137/85)  BP(mean): --  RR: 16 (03-18-23 @ 00:33) (16 - 16)  SpO2: 96% (03-18-23 @ 00:33) (96% - 96%)  Wt(kg): --    PHYSICAL EXAM:    Constitutional: resting comfortably in bed; NAD  Head: NC/AT  Eyes: PERRL, EOMI, anicteric sclera  ENT: no nasal discharge; uvula midline, no oropharyngeal erythema or exudates; MMM  Neck: supple  Respiratory: CTA B/L; no W/R/R, no retractions  Cardiac: +S1/S2; RRR; no M/R/G  Gastrointestinal: abdomen soft, NT/ND; no rebound or guarding; +BSx4  Back: spine midline, no bony tenderness  Extremities: WWP, no clubbing or cyanosis; no peripheral edema  Musculoskeletal: NROM x4; no joint swelling, tenderness or erythema  Vascular: distal pulses intact  Dermatologic: skin warm, dry and intact; no rashes  Lymphatic: no submandibular or cervical LAD  Neurologic: AAOx3; moves all 4 extremities  Psychiatric: affect and characteristics of appearance, verbalizations, behaviors are appropriate

## 2023-03-19 LAB
-  AMIKACIN: SIGNIFICANT CHANGE UP
-  AMPICILLIN/SULBACTAM: SIGNIFICANT CHANGE UP
-  AMPICILLIN: SIGNIFICANT CHANGE UP
-  AZTREONAM: SIGNIFICANT CHANGE UP
-  CEFAZOLIN: SIGNIFICANT CHANGE UP
-  CEFEPIME: SIGNIFICANT CHANGE UP
-  CEFOXITIN: SIGNIFICANT CHANGE UP
-  CEFTRIAXONE: SIGNIFICANT CHANGE UP
-  CIPROFLOXACIN: SIGNIFICANT CHANGE UP
-  ERTAPENEM: SIGNIFICANT CHANGE UP
-  GENTAMICIN: SIGNIFICANT CHANGE UP
-  IMIPENEM: SIGNIFICANT CHANGE UP
-  LEVOFLOXACIN: SIGNIFICANT CHANGE UP
-  MEROPENEM: SIGNIFICANT CHANGE UP
-  PIPERACILLIN/TAZOBACTAM: SIGNIFICANT CHANGE UP
-  TOBRAMYCIN: SIGNIFICANT CHANGE UP
-  TRIMETHOPRIM/SULFAMETHOXAZOLE: SIGNIFICANT CHANGE UP
ANION GAP SERPL CALC-SCNC: 12 MMOL/L — SIGNIFICANT CHANGE UP (ref 7–14)
BUN SERPL-MCNC: 12 MG/DL — SIGNIFICANT CHANGE UP (ref 7–23)
CALCIUM SERPL-MCNC: 8.2 MG/DL — LOW (ref 8.4–10.5)
CHLORIDE SERPL-SCNC: 108 MMOL/L — HIGH (ref 98–107)
CO2 SERPL-SCNC: 20 MMOL/L — LOW (ref 22–31)
CREAT SERPL-MCNC: 1.2 MG/DL — SIGNIFICANT CHANGE UP (ref 0.5–1.3)
CULTURE RESULTS: SIGNIFICANT CHANGE UP
CULTURE RESULTS: SIGNIFICANT CHANGE UP
EGFR: 45 ML/MIN/1.73M2 — LOW
GLUCOSE SERPL-MCNC: 96 MG/DL — SIGNIFICANT CHANGE UP (ref 70–99)
HCT VFR BLD CALC: 37.3 % — SIGNIFICANT CHANGE UP (ref 34.5–45)
HGB BLD-MCNC: 11.9 G/DL — SIGNIFICANT CHANGE UP (ref 11.5–15.5)
MAGNESIUM SERPL-MCNC: 2.1 MG/DL — SIGNIFICANT CHANGE UP (ref 1.6–2.6)
MCHC RBC-ENTMCNC: 31.7 PG — SIGNIFICANT CHANGE UP (ref 27–34)
MCHC RBC-ENTMCNC: 31.9 GM/DL — LOW (ref 32–36)
MCV RBC AUTO: 99.5 FL — SIGNIFICANT CHANGE UP (ref 80–100)
METHOD TYPE: SIGNIFICANT CHANGE UP
NRBC # BLD: 0 /100 WBCS — SIGNIFICANT CHANGE UP (ref 0–0)
NRBC # FLD: 0 K/UL — SIGNIFICANT CHANGE UP (ref 0–0)
ORGANISM # SPEC MICROSCOPIC CNT: SIGNIFICANT CHANGE UP
PHOSPHATE SERPL-MCNC: 2.3 MG/DL — LOW (ref 2.5–4.5)
PLATELET # BLD AUTO: 446 K/UL — HIGH (ref 150–400)
POTASSIUM SERPL-MCNC: 3.9 MMOL/L — SIGNIFICANT CHANGE UP (ref 3.5–5.3)
POTASSIUM SERPL-SCNC: 3.9 MMOL/L — SIGNIFICANT CHANGE UP (ref 3.5–5.3)
RBC # BLD: 3.75 M/UL — LOW (ref 3.8–5.2)
RBC # FLD: 15 % — HIGH (ref 10.3–14.5)
SODIUM SERPL-SCNC: 140 MMOL/L — SIGNIFICANT CHANGE UP (ref 135–145)
SPECIMEN SOURCE: SIGNIFICANT CHANGE UP
SPECIMEN SOURCE: SIGNIFICANT CHANGE UP
WBC # BLD: 9.97 K/UL — SIGNIFICANT CHANGE UP (ref 3.8–10.5)
WBC # FLD AUTO: 9.97 K/UL — SIGNIFICANT CHANGE UP (ref 3.8–10.5)

## 2023-03-19 RX ORDER — SODIUM,POTASSIUM PHOSPHATES 278-250MG
1 POWDER IN PACKET (EA) ORAL
Refills: 0 | Status: COMPLETED | OUTPATIENT
Start: 2023-03-19 | End: 2023-03-20

## 2023-03-19 RX ORDER — LACTOBACILLUS ACIDOPHILUS 100MM CELL
1 CAPSULE ORAL DAILY
Refills: 0 | Status: DISCONTINUED | OUTPATIENT
Start: 2023-03-19 | End: 2023-03-20

## 2023-03-19 RX ORDER — MESALAMINE 400 MG
800 TABLET, DELAYED RELEASE (ENTERIC COATED) ORAL EVERY 8 HOURS
Refills: 0 | Status: DISCONTINUED | OUTPATIENT
Start: 2023-03-19 | End: 2023-03-20

## 2023-03-19 RX ADMIN — PANTOPRAZOLE SODIUM 40 MILLIGRAM(S): 20 TABLET, DELAYED RELEASE ORAL at 05:26

## 2023-03-19 RX ADMIN — Medication 50 MICROGRAM(S): at 05:26

## 2023-03-19 RX ADMIN — Medication 1 TABLET(S): at 17:03

## 2023-03-19 RX ADMIN — CEFTRIAXONE 100 MILLIGRAM(S): 500 INJECTION, POWDER, FOR SOLUTION INTRAMUSCULAR; INTRAVENOUS at 22:14

## 2023-03-19 NOTE — DIETITIAN INITIAL EVALUATION ADULT - PERTINENT MEDS FT
MEDICATIONS  (STANDING):  cefTRIAXone   IVPB 2000 milliGRAM(s) IV Intermittent every 24 hours  enoxaparin Injectable 40 milliGRAM(s) SubCutaneous every 24 hours  lactobacillus acidophilus 1 Tablet(s) Oral daily  levothyroxine 50 MICROGram(s) Oral <User Schedule>  levothyroxine 75 MICROGram(s) Oral <User Schedule>  mesalamine DR Capsule 2400 milliGRAM(s) Oral daily  pantoprazole    Tablet 40 milliGRAM(s) Oral before breakfast  potassium phosphate / sodium phosphate Tablet (K-PHOS No. 2) 1 Tablet(s) Oral two times a day with meals    MEDICATIONS  (PRN):  acetaminophen     Tablet .. 650 milliGRAM(s) Oral every 6 hours PRN Temp greater or equal to 38C (100.4F), Mild Pain (1 - 3)  melatonin 3 milliGRAM(s) Oral at bedtime PRN Insomnia

## 2023-03-19 NOTE — DIETITIAN INITIAL EVALUATION ADULT - ORAL INTAKE PTA/DIET HISTORY
Pt reports poor appetite at home x 2 weeks but slowly improving now. Pt reports stable weights in past 3 months.

## 2023-03-19 NOTE — PROGRESS NOTE ADULT - PROBLEM SELECTOR PLAN 1
Likely 2/2 to pyelonephritis, urine cx positive for e. coli.   - c/w IV ceftriaxone 2 g QD  - f/u repeat bcx  - ID consulted
Likely 2/2 to pyelonephritis, urine cx positive for e. coli.   - c/w IV ceftriaxone 2 g QD  - f/u repeat bcx  - ID consulted

## 2023-03-19 NOTE — DIETITIAN INITIAL EVALUATION ADULT - PERTINENT LABORATORY DATA
03-19    140  |  108<H>  |  12  ----------------------------<  96  3.9   |  20<L>  |  1.20    Ca    8.2<L>      19 Mar 2023 05:10  Phos  2.3     03-19  Mg     2.10     03-19    TPro  6.4  /  Alb  3.0<L>  /  TBili  0.5  /  DBili  x   /  AST  37<H>  /  ALT  24  /  AlkPhos  62  03-17

## 2023-03-19 NOTE — PROGRESS NOTE ADULT - PROBLEM SELECTOR PLAN 3
c/w synthroid 50 mcg and 75 mcg alternating every other day
c/w synthroid 50 mcg and 75 mcg alternating every other day

## 2023-03-19 NOTE — PROGRESS NOTE ADULT - PROBLEM SELECTOR PLAN 4
Britni BARAKAT Alok, scribed for Tawanda Aldrich Caretaker


- Sherrell 


Courtney Santillan MD [Primary Care Provider] - 


Kristal DAS,Giovani DIAS [Tawanda Aldrich MD.
Stable. Pt on home mesalamine, unsure of dose.  - will continue with mesalamine 1.2 g for now
Stable. Pt on home mesalamine, unsure of dose.  - outpt clinic meds reviewed.   - on mesalamine (Lialda) 1.2 g TID   - outpt GI Dr. Garcia at [facility].

## 2023-03-19 NOTE — PROGRESS NOTE ADULT - NSPROGADDITIONALINFOA_GEN_ALL_CORE
d/w pt and NP.  d/w ID.    - Dr. JUANA Chanel ([facility])  - [phone]
d/w pt and NP.  d/w ID.    [facility].   prohelath GI Dr. Garcia.     - Dr. JUANA Sanchezet ([facility])  - [phone]

## 2023-03-19 NOTE — DIETITIAN INITIAL EVALUATION ADULT - OTHER INFO
82-year-old female past medical history of lymphocytic colitis, hypothyroidism presents ED for positive blood cultures during ED visit 3/15 grew e coli. Pt admitted for e. coli bacteremia 2/2 pyelo. Pt admitted for IV abx.    Pt seen and reports improving appetite now since admission, consuming <50% intake per has improved since pta. Pt amenable to supplement once per day. Will add Hormel Vital shake x1/day. Noted skin ecchymosis, no edema per nursing flow sheet. Pt reports her legs are big but not edematous.

## 2023-03-20 ENCOUNTER — TRANSCRIPTION ENCOUNTER (OUTPATIENT)
Age: 83
End: 2023-03-20

## 2023-03-20 VITALS
DIASTOLIC BLOOD PRESSURE: 58 MMHG | RESPIRATION RATE: 16 BRPM | SYSTOLIC BLOOD PRESSURE: 124 MMHG | HEART RATE: 89 BPM | OXYGEN SATURATION: 96 % | TEMPERATURE: 98 F

## 2023-03-20 LAB
ANION GAP SERPL CALC-SCNC: 13 MMOL/L — SIGNIFICANT CHANGE UP (ref 7–14)
BUN SERPL-MCNC: 12 MG/DL — SIGNIFICANT CHANGE UP (ref 7–23)
CALCIUM SERPL-MCNC: 8.5 MG/DL — SIGNIFICANT CHANGE UP (ref 8.4–10.5)
CHLORIDE SERPL-SCNC: 106 MMOL/L — SIGNIFICANT CHANGE UP (ref 98–107)
CO2 SERPL-SCNC: 21 MMOL/L — LOW (ref 22–31)
CREAT SERPL-MCNC: 1.22 MG/DL — SIGNIFICANT CHANGE UP (ref 0.5–1.3)
EGFR: 45 ML/MIN/1.73M2 — LOW
GI PCR PANEL: SIGNIFICANT CHANGE UP
GLUCOSE SERPL-MCNC: 94 MG/DL — SIGNIFICANT CHANGE UP (ref 70–99)
HCT VFR BLD CALC: 38.7 % — SIGNIFICANT CHANGE UP (ref 34.5–45)
HGB BLD-MCNC: 12.3 G/DL — SIGNIFICANT CHANGE UP (ref 11.5–15.5)
MAGNESIUM SERPL-MCNC: 1.9 MG/DL — SIGNIFICANT CHANGE UP (ref 1.6–2.6)
MCHC RBC-ENTMCNC: 31.6 PG — SIGNIFICANT CHANGE UP (ref 27–34)
MCHC RBC-ENTMCNC: 31.8 GM/DL — LOW (ref 32–36)
MCV RBC AUTO: 99.5 FL — SIGNIFICANT CHANGE UP (ref 80–100)
NRBC # BLD: 0 /100 WBCS — SIGNIFICANT CHANGE UP (ref 0–0)
NRBC # FLD: 0 K/UL — SIGNIFICANT CHANGE UP (ref 0–0)
PHOSPHATE SERPL-MCNC: 2.6 MG/DL — SIGNIFICANT CHANGE UP (ref 2.5–4.5)
PLATELET # BLD AUTO: 502 K/UL — HIGH (ref 150–400)
POTASSIUM SERPL-MCNC: 3.9 MMOL/L — SIGNIFICANT CHANGE UP (ref 3.5–5.3)
POTASSIUM SERPL-SCNC: 3.9 MMOL/L — SIGNIFICANT CHANGE UP (ref 3.5–5.3)
RBC # BLD: 3.89 M/UL — SIGNIFICANT CHANGE UP (ref 3.8–5.2)
RBC # FLD: 15.1 % — HIGH (ref 10.3–14.5)
SODIUM SERPL-SCNC: 140 MMOL/L — SIGNIFICANT CHANGE UP (ref 135–145)
WBC # BLD: 11.75 K/UL — HIGH (ref 3.8–10.5)
WBC # FLD AUTO: 11.75 K/UL — HIGH (ref 3.8–10.5)

## 2023-03-20 RX ORDER — LACTOBACILLUS ACIDOPHILUS 100MM CELL
1 CAPSULE ORAL
Qty: 0 | Refills: 0 | DISCHARGE
Start: 2023-03-20

## 2023-03-20 RX ORDER — CEFUROXIME AXETIL 250 MG
500 TABLET ORAL EVERY 12 HOURS
Refills: 0 | Status: DISCONTINUED | OUTPATIENT
Start: 2023-03-20 | End: 2023-03-20

## 2023-03-20 RX ORDER — CEFUROXIME AXETIL 250 MG
1 TABLET ORAL
Qty: 15 | Refills: 0
Start: 2023-03-20 | End: 2023-03-26

## 2023-03-20 RX ADMIN — Medication 1 TABLET(S): at 06:17

## 2023-03-20 RX ADMIN — Medication 800 MILLIGRAM(S): at 07:41

## 2023-03-20 RX ADMIN — Medication 800 MILLIGRAM(S): at 01:14

## 2023-03-20 RX ADMIN — PANTOPRAZOLE SODIUM 40 MILLIGRAM(S): 20 TABLET, DELAYED RELEASE ORAL at 06:17

## 2023-03-20 RX ADMIN — Medication 75 MICROGRAM(S): at 06:17

## 2023-03-20 RX ADMIN — Medication 1 TABLET(S): at 11:10

## 2023-03-20 NOTE — PROGRESS NOTE ADULT - SUBJECTIVE AND OBJECTIVE BOX
[facility], Division of Infectious Diseases  MARISOL Gooden Y. Patel, S. Shah, G. Patrick  [phone]  ([phone] - weekdays after 5pm and weekends)    Name: GIO CHA  Age/Gender: 82y Female  MRN: [id number]    Interval History:  Notes reviewed.   No concerning overnight events.  Afebrile.   states she feels well  she is very eager to go home    Allergies: latex (Rash)  penicillin (Rash)      Objective:  Vitals:   T(F): 98.4 (03-20-23 @ 06:25), Max: 98.4 (03-20-23 @ 06:25)  HR: 78 (03-20-23 @ 06:25) (72 - 83)  BP: 134/73 (03-20-23 @ 06:25) (132/63 - 143/73)  RR: 16 (03-20-23 @ 06:25) (16 - 18)  SpO2: 98% (03-20-23 @ 06:25) (97% - 98%)  Physical Examination:  General: no acute distress  HEENT: anicteric  Cardio: S1, S2, normal rate  Resp: clear to auscultation anteriorly  Abd: soft, NT, ND  Ext: no LE edema, hematoma L arm  Skin: warm, dry    Laboratory Studies:  CBC:                       12.3   11.75 )-----------( 502      ( 20 Mar 2023 05:58 )             38.7     WBC Trend:  11.75 03-20-23 @ 05:58  9.97 03-19-23 @ 05:10  10.17 03-18-23 @ 07:12  12.46 03-17-23 @ 20:25  12.48 03-15-23 @ 21:10    CMP: 03-20    140  |  106  |  12  ----------------------------<  94  3.9   |  21<L>  |  1.22    Ca    8.5      20 Mar 2023 05:58  Phos  2.6     03-20  Mg     1.90     03-20              Microbiology: reviewed     Culture - Urine (collected 03-18-23 @ 01:40)  Source: Clean Catch Clean Catch (Midstream)  Final Report (03-19-23 @ 07:33):    <10,000 CFU/mL Normal Urogenital Nicky    Culture - Blood (collected 03-17-23 @ 20:25)  Source: .Blood Blood-Peripheral  Preliminary Report (03-19-23 @ 03:01):    No growth to date.    Culture - Blood (collected 03-17-23 @ 20:10)  Source: .Blood Blood-Peripheral  Preliminary Report (03-19-23 @ 03:01):    No growth to date.    Culture - Blood (collected 03-15-23 @ 21:50)  Source: .Blood Blood-Peripheral  Gram Stain (03-17-23 @ 15:53):    Growth in aerobic bottle: Gram Negative Rods  Final Report (03-19-23 @ 08:17):    Growth in aerobic bottle: Escherichia coli    ***Blood Panel PCR results on this specimen are available    approximately 3 hours after the Gram stain result.***    Gram stain, PCR, and/or culture results may not always    correspond due to difference in methodologies.    ************************************************************    This PCR assay was performed by multiplex PCR. This    Assay tests for 66 bacterial and resistance gene targets.    Please refer to the [facility] test directory    at [email or web address] for details.  Organism: Blood Culture PCR  Escherichia coli (03-19-23 @ 08:17)  Organism: Escherichia coli (03-19-23 @ 08:17)      -  Amikacin: S <=16      -  Ampicillin: R >16 These ampicillin results predict results for amoxicillin      -  Ampicillin/Sulbactam: R >16/8 Enterobacter, Klebsiella aerogenes, Citrobacter, and Serratia may develop resistance during prolonged therapy (3-4 days)      -  Aztreonam: S <=4      -  Cefazolin: S <=2 Enterobacter, Klebsiella aerogenes, Citrobacter, and Serratia may develop resistance during prolonged therapy (3-4 days)      -  Cefepime: S <=2      -  Cefoxitin: S <=8      -  Ceftriaxone: S <=1 Enterobacter, Klebsiella aerogenes, Citrobacter, and Serratia may develop resistance during prolonged therapy      -  Ciprofloxacin: S <=0.25      -  Ertapenem: S <=0.5      -  Gentamicin: S <=2      -  Imipenem: S <=1      -  Levofloxacin: S <=0.5      -  Meropenem: S <=1      -  Piperacillin/Tazobactam: S <=8      -  Tobramycin: S <=2      -  Trimethoprim/Sulfamethoxazole: S <=0.5/9.5      Method Type: ABHI  Organism: Blood Culture PCR (03-19-23 @ 08:17)      -  Escherichia coli: Detec      Method Type: PCR    Culture - Urine (collected 03-15-23 @ 21:40)  Source: Clean Catch Clean Catch (Midstream)  Final Report (03-18-23 @ 11:30):    >100,000 CFU/ml Escherichia coli  Organism: Escherichia coli (03-18-23 @ 11:30)  Organism: Escherichia coli (03-18-23 @ 11:30)      -  Amikacin: S <=16      -  Amoxicillin/Clavulanic Acid: S <=8/4 Consider reserving for cystitis when ampicillin/sulbactam is resistant      -  Ampicillin: R >16 These ampicillin results predict results for amoxicillin      -  Ampicillin/Sulbactam: R >16/8 Enterobacter, Klebsiella aerogenes, Citrobacter, and Serratia may develop resistance during prolonged therapy (3-4 days)      -  Aztreonam: S <=4      -  Cefazolin: S <=2 For uncomplicated UTI with K. pneumoniae, E. coli, or P. mirablis: ABHI <=16 is sensitive and ABHI >=32 is resistant. This also predicts results for oral agents cefaclor, cefdinir, cefpodoxime, cefprozil, cefuroxime axetil, cephalexin and locarbef for uncomplicated UTI. Note that some isolates may be susceptible to these agents while testing resistant to cefazolin.      -  Cefepime: S <=2      -  Cefoxitin: S <=8      -  Ceftriaxone: S <=1 Enterobacter, Klebsiella aerogenes, Citrobacter, and Serratia may develop resistance during prolonged therapy      -  Cefuroxime: S 8      -  Ciprofloxacin: S <=0.25      -  Ertapenem: S <=0.5      -  Gentamicin: S <=2      -  Imipenem: S <=1      -  Levofloxacin: S <=0.5      -  Meropenem: S <=1      -  Nitrofurantoin: S <=32 Should not be used to treat pyelonephritis      -  Piperacillin/Tazobactam: S <=8      -  Tobramycin: S <=2      -  Trimethoprim/Sulfamethoxazole: S <=0.5/9.5      Method Type: ABHI    Culture - Blood (collected 03-15-23 @ 21:10)  Source: .Blood Blood-Peripheral  Preliminary Report (03-17-23 @ 03:01):    No growth to date.        Radiology: reviewed     Medications:  acetaminophen     Tablet .. 650 milliGRAM(s) Oral every 6 hours PRN  cefTRIAXone   IVPB 2000 milliGRAM(s) IV Intermittent every 24 hours  enoxaparin Injectable 40 milliGRAM(s) SubCutaneous every 24 hours  lactobacillus acidophilus 1 Tablet(s) Oral daily  levothyroxine 50 MICROGram(s) Oral <User Schedule>  levothyroxine 75 MICROGram(s) Oral <User Schedule>  melatonin 3 milliGRAM(s) Oral at bedtime PRN  mesalamine DR Capsule 800 milliGRAM(s) Oral every 8 hours  pantoprazole    Tablet 40 milliGRAM(s) Oral before breakfast    Antimicrobials:  cefTRIAXone   IVPB 2000 milliGRAM(s) IV Intermittent every 24 hours  
SUBJECTIVE/ OVERNIGHT EVENTS:  Pt seen and examined at bedside.   No overnight event.  Feeling better.  no cp, no sob, no n/v/d.       --------------------------------------------------------------------------------------------  LABS:                        11.9   10.17 )-----------( 379      ( 18 Mar 2023 07:12 )             37.2     03-18    139  |  105  |  16  ----------------------------<  103<H>  3.3<L>   |  20<L>  |  1.18    Ca    8.2<L>      18 Mar 2023 07:12  Phos  2.6     03-18  Mg     1.90     -18    TPro  6.4  /  Alb  3.0<L>  /  TBili  0.5  /  DBili  x   /  AST  37<H>  /  ALT  24  /  AlkPhos  62  03-17    PT/INR - ( 17 Mar 2023 20:25 )   PT: 14.5 sec;   INR: 1.25 ratio         PTT - ( 17 Mar 2023 20:25 )  PTT:26.1 sec  CAPILLARY BLOOD GLUCOSE            Urinalysis Basic - ( 18 Mar 2023 01:40 )    Color: Light Yellow / Appearance: Clear / S.013 / pH: x  Gluc: x / Ketone: Negative  / Bili: Negative / Urobili: <2 mg/dL   Blood: x / Protein: Trace / Nitrite: Negative   Leuk Esterase: Moderate / RBC: 3 /HPF / WBC 13 /HPF   Sq Epi: x / Non Sq Epi: 5 /HPF / Bacteria: Occasional        RADIOLOGY & ADDITIONAL TESTS:    Imaging Personally Reviewed:  [x] YES  [ ] NO    Consultant(s) Notes Reviewed:  [x] YES  [ ] NO    MEDICATIONS  (STANDING):  cefTRIAXone   IVPB 2000 milliGRAM(s) IV Intermittent every 24 hours  enoxaparin Injectable 40 milliGRAM(s) SubCutaneous every 24 hours  levothyroxine 50 MICROGram(s) Oral <User Schedule>  levothyroxine 75 MICROGram(s) Oral <User Schedule>  pantoprazole    Tablet 40 milliGRAM(s) Oral before breakfast    MEDICATIONS  (PRN):  acetaminophen     Tablet .. 650 milliGRAM(s) Oral every 6 hours PRN Temp greater or equal to 38C (100.4F), Mild Pain (1 - 3)  melatonin 3 milliGRAM(s) Oral at bedtime PRN Insomnia      Care Discussed with Consultants/Other Providers [x] YES  [ ] NO    Vital Signs Last 24 Hrs  T(C): 37.3 (18 Mar 2023 21:39), Max: 37.3 (18 Mar 2023 21:39)  T(F): 99.2 (18 Mar 2023 21:39), Max: 99.2 (18 Mar 2023 21:39)  HR: 72 (18 Mar 2023 21:39) (66 - 73)  BP: 128/61 (18 Mar 2023 21:39) (118/62 - 156/75)  BP(mean): --  RR: 16 (18 Mar 2023 21:39) (15 - 18)  SpO2: 92% (18 Mar 2023 21:39) (92% - 97%)    Parameters below as of 18 Mar 2023 21:39  Patient On (Oxygen Delivery Method): room air      I&O's Summary      PHYSICAL EXAM:  GENERAL: NAD, well-developed, comfortable  HEAD:  Atraumatic, Normocephalic  EYES: EOMI, PERRLA, conjunctiva and sclera clear  NECK: Supple, No JVD  CHEST/LUNG: Clear to auscultation bilaterally; No wheeze  HEART: Regular rate and rhythm; No murmurs, rubs, or gallops  ABDOMEN: Soft, Nontender, Nondistended; Bowel sounds present  Neuro: AAOx3, no focal weakness, 5/5 b/l extremity strength  EXTREMITIES:  2+ Peripheral Pulses, No clubbing, cyanosis, or edema  SKIN: No rashes or lesions   
SUBJECTIVE/ OVERNIGHT EVENTS:  loose stool x 2  home mesalamine restarted  she feels well  cultures pending  no cp, no sob, no n/v/d. no abdominal pain.  no headache, no dizziness.       --------------------------------------------------------------------------------------------  LABS:                        11.9   9.97  )-----------( 446      ( 19 Mar 2023 05:10 )             37.3     -    140  |  108<H>  |  12  ----------------------------<  96  3.9   |  20<L>  |  1.20    Ca    8.2<L>      19 Mar 2023 05:10  Phos  2.3     -  Mg     2.10         TPro  6.4  /  Alb  3.0<L>  /  TBili  0.5  /  DBili  x   /  AST  37<H>  /  ALT  24  /  AlkPhos  62  -      CAPILLARY BLOOD GLUCOSE            Urinalysis Basic - ( 18 Mar 2023 01:40 )    Color: Light Yellow / Appearance: Clear / S.013 / pH: x  Gluc: x / Ketone: Negative  / Bili: Negative / Urobili: <2 mg/dL   Blood: x / Protein: Trace / Nitrite: Negative   Leuk Esterase: Moderate / RBC: 3 /HPF / WBC 13 /HPF   Sq Epi: x / Non Sq Epi: 5 /HPF / Bacteria: Occasional        RADIOLOGY & ADDITIONAL TESTS:    Imaging Personally Reviewed:  [x] YES  [ ] NO    Consultant(s) Notes Reviewed:  [x] YES  [ ] NO    MEDICATIONS  (STANDING):  cefTRIAXone   IVPB 2000 milliGRAM(s) IV Intermittent every 24 hours  enoxaparin Injectable 40 milliGRAM(s) SubCutaneous every 24 hours  lactobacillus acidophilus 1 Tablet(s) Oral daily  levothyroxine 50 MICROGram(s) Oral <User Schedule>  levothyroxine 75 MICROGram(s) Oral <User Schedule>  mesalamine DR Capsule 800 milliGRAM(s) Oral every 8 hours  pantoprazole    Tablet 40 milliGRAM(s) Oral before breakfast  potassium phosphate / sodium phosphate Tablet (K-PHOS No. 2) 1 Tablet(s) Oral two times a day with meals    MEDICATIONS  (PRN):  acetaminophen     Tablet .. 650 milliGRAM(s) Oral every 6 hours PRN Temp greater or equal to 38C (100.4F), Mild Pain (1 - 3)  melatonin 3 milliGRAM(s) Oral at bedtime PRN Insomnia      Care Discussed with Consultants/Other Providers [x] YES  [ ] NO    Vital Signs Last 24 Hrs  T(C): 36.8 (19 Mar 2023 13:54), Max: 37.3 (18 Mar 2023 21:39)  T(F): 98.2 (19 Mar 2023 13:54), Max: 99.2 (18 Mar 2023 21:39)  HR: 83 (19 Mar 2023 13:54) (72 - 83)  BP: 132/63 (19 Mar 2023 13:54) (128/61 - 135/72)  BP(mean): --  RR: 16 (19 Mar 2023 13:54) (16 - 18)  SpO2: 98% (19 Mar 2023 13:54) (92% - 98%)    Parameters below as of 19 Mar 2023 13:54  Patient On (Oxygen Delivery Method): room air      I&O's Summary        PHYSICAL EXAM:  GENERAL: NAD, well-developed, comfortable  HEAD:  Atraumatic, Normocephalic  EYES: EOMI, PERRLA, conjunctiva and sclera clear  NECK: Supple, No JVD  CHEST/LUNG: Clear to auscultation bilaterally; No wheeze  HEART: Regular rate and rhythm; No murmurs, rubs, or gallops  ABDOMEN: Soft, Nontender, Nondistended; Bowel sounds present  Neuro: AAOx3, no focal weakness, 5/5 b/l extremity strength  EXTREMITIES:  2+ Peripheral Pulses, No clubbing, cyanosis, or edema  SKIN: No rashes or lesions

## 2023-03-20 NOTE — DISCHARGE NOTE PROVIDER - NSDCCPCAREPLAN_GEN_ALL_CORE_FT
PRINCIPAL DISCHARGE DIAGNOSIS  Diagnosis: Pyelonephritis  Assessment and Plan of Treatment: You were seen by Infectious Disease in-hospital. You were started on antibiotics with improvement. Continue antibiotics as directed and monitor for signs/symptoms of infection, such as, fever/chills, burning/pain with urination, urinary frequency/hesitancy, cloudy urine, or blood in urine.        SECONDARY DISCHARGE DIAGNOSES  Diagnosis: Hypothyroidism  Assessment and Plan of Treatment: Continue your thyroid medications as recommended and follow-up with your outpatient provider for continual thyroid function testing and further medication management.      Diagnosis: Lymphocytic colitis  Assessment and Plan of Treatment: Continue with home Lialda. Follow up outpatient PCP in 1-2 weeks for further management.    Diagnosis: E coli bacteremia  Assessment and Plan of Treatment: You were seen by Infectious Disease in-hospital. You were started on antibiotics with improvement. Repeat blood cultures sent are negative to date. Continue antibiotics as directed and monitor for signs/symptoms of infection, such as, fever/chills, burning/pain with urination, urinary frequency/hesitancy, cloudy urine, or blood in urine.     PRINCIPAL DISCHARGE DIAGNOSIS  Diagnosis: Pyelonephritis  Assessment and Plan of Treatment: You were seen by Infectious Disease in-hospital. You were started on antibiotics with improvement. Continue antibiotics as directed and monitor for signs/symptoms of infection, such as, fever/chills, burning/pain with urination, urinary frequency/hesitancy, cloudy urine, or blood in urine. Follow up with [facility] ID clinic on 3/24 as per Infectious Disease.         SECONDARY DISCHARGE DIAGNOSES  Diagnosis: Hypothyroidism  Assessment and Plan of Treatment: Continue your thyroid medications as recommended and follow-up with your outpatient provider for continual thyroid function testing and further medication management.      Diagnosis: Lymphocytic colitis  Assessment and Plan of Treatment: Continue with home Lialda. Follow up outpatient PCP in 1-2 weeks for further management.    Diagnosis: E coli bacteremia  Assessment and Plan of Treatment: You were seen by Infectious Disease in-hospital. You were started on antibiotics with improvement. Repeat blood cultures sent are negative to date. Continue antibiotics as directed and monitor for signs/symptoms of infection, such as, fever/chills, burning/pain with urination, urinary frequency/hesitancy, cloudy urine, or blood in urine.

## 2023-03-20 NOTE — DISCHARGE NOTE NURSING/CASE MANAGEMENT/SOCIAL WORK - NSDCPEFALRISK_GEN_ALL_CORE
For information on Fall & Injury Prevention, visit: [email or web address] OR  [email or web address] OR  https://www.cdc.gov/steadi/patient.html

## 2023-03-20 NOTE — DISCHARGE NOTE PROVIDER - NSDCMRMEDTOKEN_GEN_ALL_CORE_FT
CeleBREX 200 mg oral capsule: 1 cap(s) orally once a day, As Needed  lactobacillus acidophilus oral capsule: 1 tab(s) orally once a day  Lialda 1.2 g oral delayed release tablet: 2 tab(s) orally once a day  Multiple Vitamins oral tablet: 1 tab(s) orally once a day  NexIUM: 1 tab(s) orally once a day  Synthroid 50 mcg (0.05 mg) oral tablet: 1 tab(s) orally every other day - alternating with synthroid 75 mcg dose  Synthroid 75 mcg (0.075 mg) oral tablet: 1 tab(s) orally every other day - alternating with synthroid 50 mcg dose    cefuroxime 500 mg oral tablet: 1 tab(s) orally every 12 hours  CeleBREX 200 mg oral capsule: 1 cap(s) orally once a day, As Needed  lactobacillus acidophilus oral capsule: 1 tab(s) orally once a day  Lialda 1.2 g oral delayed release tablet: 2 tab(s) orally once a day  Multiple Vitamins oral tablet: 1 tab(s) orally once a day  NexIUM: 1 tab(s) orally once a day  Synthroid 50 mcg (0.05 mg) oral tablet: 1 tab(s) orally every other day - alternating with synthroid 75 mcg dose  Synthroid 75 mcg (0.075 mg) oral tablet: 1 tab(s) orally every other day - alternating with synthroid 50 mcg dose

## 2023-03-20 NOTE — DISCHARGE NOTE PROVIDER - HOSPITAL COURSE
82 F PMHx lymphocytic colitis, hypothyroidism presents ED for positive blood cultures during ED visit 3/15 grew E. coli. Pt was seen in ED on 3/15 and was found to have pyelonephritis, pt was discharged of Cefdinir. Pt states prior to ED visit on 3/15, she has having fever, chills, fatigue x10 days. EKG personally reviewed from 3/15: sinus rhythm w/ PACs, no acute ischemic changes. CXR 3/15: clear lungs, no acute infiltrates. CT A/P on 3/15 with B/L renal cortical patchy hypodensities which may represent acute pyelonephritis. UCx 3/15 grew E. coli. Pt started on CTX and monitored with improvement. Repeat BCx sent 3/17 negative to date. Changed to Ceftin upon discharge    Spoke with attending, Dr. Chanel, pt is medically stable for discharge to home    82 F PMHx lymphocytic colitis, hypothyroidism presents ED for positive blood cultures during ED visit 3/15 grew E. coli. Pt was seen in ED on 3/15 and was found to have pyelonephritis, pt was discharged of Cefdinir. Pt states prior to ED visit on 3/15, she has having fever, chills, fatigue x10 days. EKG personally reviewed from 3/15: sinus rhythm w/ PACs, no acute ischemic changes. CXR 3/15: clear lungs, no acute infiltrates. CT A/P on 3/15 with B/L renal cortical patchy hypodensities which may represent acute pyelonephritis. UCx 3/15 grew E. coli. Pt started on CTX and monitored with improvement. Repeat BCx sent 3/17 negative to date. Changed to Ceftin upon discharge    Spoke with attending, Dr. Chanel, pt is medically stable for discharge to home.       Attending Addendum:  Patient seen and examined by me on the discharge day.   no cp, no sob, no n/v/d. no abdominal pain.  no headache, no dizziness.  Medications reviewed.  All questions answered in details. Follow up plan explained. d/w ID and [facility] [place].   More than 30 mins were spent evaluating patient and coordinating care for discharge.  Discharge summary sent to pt's primary care physician at [facility].

## 2023-03-20 NOTE — DISCHARGE NOTE PROVIDER - CARE PROVIDER_API CALL
Elizabeth Mitchell)  Internal Medicine  [address] [place] [address]  Phone: [phone]  Fax: [phone]  Follow Up Time:     Elia Garcia)  Gastroenterology; Internal Medicine  [address] [place] [address]  Phone: [phone]  Fax: [phone]  Follow Up Time:

## 2023-03-20 NOTE — DISCHARGE NOTE NURSING/CASE MANAGEMENT/SOCIAL WORK - PATIENT PORTAL LINK FT
You can access the FollowMyHealth Patient Portal offered by [facility] by registering at the following website: [email or web address]. By joining [facility]’s FollowMyHealth portal, you will also be able to view your health information using other applications (apps) compatible with our system.

## 2023-03-20 NOTE — PROGRESS NOTE ADULT - ASSESSMENT
Pt is an 82-year-old female past medical history of lymphocytic colitis on mesalamine, hypothyroidism presents ED for positive blood cultures during ED visit 3/15 grew e coli.   ecoli bacteremia/gu etiology    plan  urine and blood culture sensitivities reviewed  repeat blood cultures 3/17- NGTD  c/w ceftriaxone while inpatient  pt does not wish to use certain antibiotics including fluoroquinolones given side effect profile  on discharge transition to cefuroxime 500mg bid to complete 10 day course w/ last day 3/26  f/u in [facility] on 3/24    Ariel Nguyen M.D.  [facility], Division of Infectious Diseases  [phone]  After 5pm on weekdays and all day on weekends - please call [phone]  Pt is an 82-year-old female past medical history of lymphocytic colitis on mesalamine, hypothyroidism presents ED for positive blood cultures during ED visit 3/15 grew e coli.   ecoli bacteremia/gu etiology    plan  urine and blood culture sensitivities reviewed  repeat blood cultures 3/17- NGTD  c/w ceftriaxone while inpatient  pt does not wish to use certain antibiotics including fluoroquinolones given side effect profile  on discharge transition to cefuroxime 500mg bid to complete 14 day course w/ last day 3/27  f/u in [facility] on 3/24    Ariel Nguyen M.D.  [facility], Division of Infectious Diseases  [phone]  After 5pm on weekdays and all day on weekends - please call [phone]

## 2023-03-21 LAB
CULTURE RESULTS: SIGNIFICANT CHANGE UP
SPECIMEN SOURCE: SIGNIFICANT CHANGE UP

## 2023-03-23 LAB
CULTURE RESULTS: SIGNIFICANT CHANGE UP
CULTURE RESULTS: SIGNIFICANT CHANGE UP
SPECIMEN SOURCE: SIGNIFICANT CHANGE UP
SPECIMEN SOURCE: SIGNIFICANT CHANGE UP

## 2023-11-30 NOTE — PROGRESS NOTE ADULT - PROBLEM SELECTOR PLAN 2
----- Message from Francisca Booth sent at 11/30/2023 11:25 AM EST -----  Subject: Referral Request    Reason for referral request? ENT  Provider patient wants to be referred to(if known):     Provider Phone Number(if known):[phone]    Additional Information for Provider?  The ENT @ [facility] do not take her   insurance she has [facility] fax number [phone]  ---------------------------------------------------------------------------  --------------  [address]    [phone]; OK to leave message on voicemail  ---------------------------------------------------------------------------  --------------
Krista Schwartz is working on this referral. She was notified and will reach out to pt to discuss next options
- c/w IV ceftriaxone as above
- c/w IV ceftriaxone as above

## 2024-02-09 ENCOUNTER — NON-APPOINTMENT (OUTPATIENT)
Age: 84
End: 2024-02-09

## 2024-06-03 ENCOUNTER — APPOINTMENT (OUTPATIENT)
Dept: ORTHOPEDIC SURGERY | Facility: CLINIC | Age: 84
End: 2024-06-03

## 2024-06-03 VITALS — BODY MASS INDEX: 34.15 KG/M2 | HEIGHT: 64 IN | WEIGHT: 200 LBS

## 2024-06-03 DIAGNOSIS — M43.16 SPONDYLOLISTHESIS, LUMBAR REGION: ICD-10-CM

## 2024-06-03 DIAGNOSIS — M79.10 MYALGIA, UNSPECIFIED SITE: ICD-10-CM

## 2024-06-03 DIAGNOSIS — M60.9 MYOSITIS, UNSPECIFIED: ICD-10-CM

## 2024-06-03 DIAGNOSIS — M48.07 SPINAL STENOSIS, LUMBOSACRAL REGION: ICD-10-CM

## 2024-06-03 PROCEDURE — 99214 OFFICE O/P EST MOD 30 MIN: CPT | Mod: 25

## 2024-06-03 PROCEDURE — 20552 NJX 1/MLT TRIGGER POINT 1/2: CPT

## 2024-06-03 PROCEDURE — 72100 X-RAY EXAM L-S SPINE 2/3 VWS: CPT

## 2024-06-03 NOTE — DISCUSSION/SUMMARY
[de-identified] : S/P L4-5 fusion, right SI joint fusion. Right sacroiliitis. Discussed all options. Referred to Dr. Underwood for left knee evaluation. I offered an injection after all risks were explained including allergic reaction to an infection under sterile conditions 1 mg of Depo-Medrol and 2 cc of 1% Lidocaine without epinephrine was injected into the painful site. The patient tolerated the procedure well and received significant relief following the injection. (right SI) All options discussed including rest, medicine, home exercise, acupuncture, Chiropractic care, Physical Therapy, Pain management, and last resort surgery. All questions were answered, all alternatives discussed, and the patient is in complete agreement with the treatment plan which the patient contributed to and discussed with me through the shared decision-making process. Follow-up appointment as instructed. Any issues and the patient will call or come in sooner.

## 2024-06-03 NOTE — ADDENDUM
[FreeTextEntry1] : This note was written by Sekou Rebollar on 06/03/2024 acting as scribe for Dr. David Avila M.D.  I, David Avila MD, have read and attest that all the information, medical decision making and discharge instructions within are true and accurate.

## 2024-06-03 NOTE — HISTORY OF PRESENT ILLNESS
[de-identified] : 83 year old female presents for initial evaluation of right sided back pain. Had SI joint Fusion with Dr. Anguiano. Has L4-5 fusion. She also complains of left knee pain, has seen Dr. Rivera. No fever, chills, sweats, nausea/vomiting. No bowel or bladder dysfunction, no recent weight loss or gain. No night pain. This history is in addition to the intake form that I personally reviewed. [Stable] : stable

## 2024-06-03 NOTE — PHYSICAL EXAM
[Normal] : Gait: normal [Nath's Sign] : negative Nath's sign [Pronator Drift] : negative pronator drift [SLR] : negative straight leg raise [de-identified] : 5 out of 5 motor strength, sensation is intact and symmetrical full range of motion flexion extension and rotation, no palpatory tenderness full range of motion of hips knees shoulders and elbows (all four extremities), no atrophy, negative straight leg raise, no pathological reflexes, no swelling, normal ambulation, no apparent distress skin is intact, no palpable lymph nodes, no upper or lower extremity instability, alert and oriented x3 and normal mood. Normal finger-to nose test.  No upper motor neuron findings. Right SI joint pain. [de-identified] : XR AP Lat Lumbar 06/03/2024 -L4-5 fusion and right SI joint fusion, adequate- reviewed with the patient.

## 2024-06-07 ENCOUNTER — APPOINTMENT (OUTPATIENT)
Dept: ORTHOPEDIC SURGERY | Facility: CLINIC | Age: 84
End: 2024-06-07
Payer: MEDICARE

## 2024-06-07 VITALS — BODY MASS INDEX: 34.15 KG/M2 | WEIGHT: 200 LBS | HEIGHT: 64 IN

## 2024-06-07 DIAGNOSIS — M17.12 UNILATERAL PRIMARY OSTEOARTHRITIS, LEFT KNEE: ICD-10-CM

## 2024-06-07 PROCEDURE — 99214 OFFICE O/P EST MOD 30 MIN: CPT | Mod: 25

## 2024-06-07 PROCEDURE — 73564 X-RAY EXAM KNEE 4 OR MORE: CPT | Mod: LT

## 2024-06-07 PROCEDURE — 20610 DRAIN/INJ JOINT/BURSA W/O US: CPT | Mod: LT

## 2024-06-07 NOTE — DISCUSSION/SUMMARY
[de-identified] : The patient has left knee osteoarthritis. They are not an appropriate candidate for surgical intervention at this time. An extensive discussion was conducted on the natural history of the disease and the variety of surgical and non-surgical options available to the patient including, but not limited to non-steroidal anti-inflammatory medications, steroid injections, physical therapy, maintenance of ideal body weight, and reduction of activity. I recommended a prescription of Mobic but the patient would prefer to use OTC NSAIDs at this time. The patient will schedule an appointment as needed.  Informed consent for left knee injection  was obtained.  All risks, benefits and alternatives were discussed. These included but were not limited to bleeding, infection and allergic reaction. All questions were answered. A time out was performed. Left  knee were prepped and draped in sterile fashion. Using sterile technique, 40mg of Kenalog, 4cc of 1% lidocaine, 4cc of 0.25% marcaine using a 21-gauge needle. A sterile dressing was applied. Post injection instructions were reviewed. The patient tolerated the procedure well.

## 2024-06-07 NOTE — PHYSICAL EXAM
[de-identified] : Well developed, well nourished in no apparent distress, awake, alert and orientated to person, place and time. with appropriate mood and affect.  Respirations are even and unlabored. Gait evaluation does reveal a limp. There is no inguinal adenopathy.  The affected limb is well-perfused, without skin lesions, shows a grossly normal motor and sensory examination.  Knee motion does cause pain. ROM of the knee is 0-110 degrees.  5 degrees varus The knee is stable within that range-of-motion to AP and ML stress.  Muscle strength is normal. Pedal pulses are palpable. [de-identified] : Long standing  knee, AP knee, lateral knee, tunnel and patellar views of the left knee were ordered and taken in the office and demonstrate degenerative joint disease of the knee with joint space narrowing, osteophyte formation, and subchondral sclerosis.

## 2024-06-07 NOTE — HISTORY OF PRESENT ILLNESS
[de-identified] : This is a very nice  83 year old  female experiencing worsening pain in the left knee which is severe in intensity and has been going on for at least 3 months now.  She did try HA injections last year with Dr. Rivera which did not seem to help.  The pain substantially limits activities of daily living. Walking tolerance is reduced. Medication and activity modification have been minimally effective for a period lasting greater than three months in duration. Assistive devices and external support were not deemed by the patient to be helpful in improving their function. . The patient denies any radiation of the pain to the feet and it is not associated with numbness, tingling, or weakness.

## 2024-09-18 ENCOUNTER — APPOINTMENT (OUTPATIENT)
Dept: ORTHOPEDIC SURGERY | Facility: CLINIC | Age: 84
End: 2024-09-18

## 2024-09-18 VITALS — WEIGHT: 200 LBS | BODY MASS INDEX: 34.15 KG/M2 | HEIGHT: 64 IN

## 2024-09-18 DIAGNOSIS — M51.36 OTHER INTERVERTEBRAL DISC DEGENERATION, LUMBAR REGION: ICD-10-CM

## 2024-09-18 DIAGNOSIS — M60.9 MYOSITIS, UNSPECIFIED: ICD-10-CM

## 2024-09-18 DIAGNOSIS — M79.10 MYALGIA, UNSPECIFIED SITE: ICD-10-CM

## 2024-09-18 PROCEDURE — 20552 NJX 1/MLT TRIGGER POINT 1/2: CPT

## 2024-09-18 PROCEDURE — 99214 OFFICE O/P EST MOD 30 MIN: CPT | Mod: 25

## 2024-09-18 NOTE — PHYSICAL EXAM
[Normal] : Gait: normal [Nath's Sign] : negative Nath's sign [Pronator Drift] : negative pronator drift [SLR] : negative straight leg raise [de-identified] : 5 out of 5 motor strength, sensation is intact and symmetrical full range of motion flexion extension and rotation, no palpatory tenderness full range of motion of hips knees shoulders and elbows (all four extremities), no atrophy, negative straight leg raise, no pathological reflexes, no swelling, normal ambulation, no apparent distress skin is intact, no palpable lymph nodes, no upper or lower extremity instability, alert and oriented x3 and normal mood. Normal finger-to nose test.  No upper motor neuron findings. Right SI joint pain. [de-identified] : XR AP Lat Lumbar 06/03/2024 -L4-5 fusion and right SI joint fusion, adequate- reviewed with the patient.

## 2024-09-18 NOTE — HISTORY OF PRESENT ILLNESS
[Stable] : stable [de-identified] : 83 year old female presents for follow up evaluation of right sided back pain. Had right SI joint Fusion with Dr. Simeon in March 2019 Has L4-5 fusion. She also complains of left knee pain, has seen Dr. Rivera, Dr. Underwood. Was given right SI joint injection at last visit in June which provided relief x 2 months. She presents today for repeat injection.   No fever, chills, sweats, nausea/vomiting. No bowel or bladder dysfunction, no recent weight loss or gain. No night pain. This history is in addition to the intake form that I personally reviewed.

## 2024-09-18 NOTE — DISCUSSION/SUMMARY
[de-identified] : S/P L4-5 fusion, right SI joint fusion. Right sacroiliitis. Discussed all options. I offered an injection after all risks were explained including allergic reaction to an infection under sterile conditions 1 mg of Depo-Medrol and 2 cc of 1% lidocaine without epinephrine was injected into the painful site. The patient tolerated the procedure well and received significant relief following the injection. All options discussed including rest, medicine, home exercise, acupuncture, Chiropractic care, Physical Therapy, Pain management, and last resort surgery. All questions were answered, all alternatives discussed, and the patient is in complete agreement with the treatment plan which the patient contributed to and discussed with me through the shared decision-making process. Follow-up appointment as instructed. Any issues and the patient will call or come in sooner.

## 2024-10-01 ENCOUNTER — APPOINTMENT (OUTPATIENT)
Dept: ORTHOPEDIC SURGERY | Facility: CLINIC | Age: 84
End: 2024-10-01
Payer: MEDICARE

## 2024-10-01 VITALS — HEIGHT: 64 IN | WEIGHT: 200 LBS | BODY MASS INDEX: 34.15 KG/M2

## 2024-10-01 DIAGNOSIS — M17.12 UNILATERAL PRIMARY OSTEOARTHRITIS, LEFT KNEE: ICD-10-CM

## 2024-10-01 PROCEDURE — 20610 DRAIN/INJ JOINT/BURSA W/O US: CPT | Mod: LT

## 2024-10-01 PROCEDURE — 99214 OFFICE O/P EST MOD 30 MIN: CPT | Mod: 25

## 2024-10-02 NOTE — PHYSICAL EXAM
[de-identified] : Well developed, well nourished in no apparent distress, awake, alert and orientated to person, place and time. with appropriate mood and affect.  Respirations are even and unlabored. Gait evaluation does reveal a limp. There is no inguinal adenopathy.  The affected limb is well-perfused, without skin lesions, shows a grossly normal motor and sensory examination.  Knee motion does cause pain. ROM of the knee is 0-110 degrees.  5 degrees varus The knee is stable within that range-of-motion to AP and ML stress.  Muscle strength is normal. Pedal pulses are palpable. [de-identified] : Long standing knee, AP knee, lateral knee, and patellar views of the left knee were brought in by the patient which I reviewed and demonstrate degenerative joint disease of the knee with joint space narrowing, osteophyte formation, and subchondral sclerosis.

## 2024-10-02 NOTE — DISCUSSION/SUMMARY
[de-identified] : The patient has left knee osteoarthritis. TAn extensive discussion was conducted on the natural history of the disease and the variety of surgical and non-surgical options available to the patient including, but not limited to non-steroidal anti-inflammatory medications, steroid injections, physical therapy, maintenance of ideal body weight, and reduction of activity. I recommended a prescription of Mobic but the patient would prefer to use OTC NSAIDs at this time. The patient will schedule an appointment as needed.  Informed consent for left knee injection  was obtained.  All risks, benefits and alternatives were discussed. These included but were not limited to bleeding, infection and allergic reaction. All questions were answered. A time out was performed. Left  knee were prepped and draped in sterile fashion. Using sterile technique, 40mg of Kenalog, 4cc of 1% lidocaine, 4cc of 0.25% marcaine using a 21-gauge needle. A sterile dressing was applied. Post injection instructions were reviewed. The patient tolerated the procedure well.

## 2024-10-02 NOTE — PHYSICAL EXAM
[de-identified] : Well developed, well nourished in no apparent distress, awake, alert and orientated to person, place and time. with appropriate mood and affect.  Respirations are even and unlabored. Gait evaluation does reveal a limp. There is no inguinal adenopathy.  The affected limb is well-perfused, without skin lesions, shows a grossly normal motor and sensory examination.  Knee motion does cause pain. ROM of the knee is 0-110 degrees.  5 degrees varus The knee is stable within that range-of-motion to AP and ML stress.  Muscle strength is normal. Pedal pulses are palpable. [de-identified] : Long standing knee, AP knee, lateral knee, and patellar views of the left knee were brought in by the patient which I reviewed and demonstrate degenerative joint disease of the knee with joint space narrowing, osteophyte formation, and subchondral sclerosis.

## 2024-10-02 NOTE — DISCUSSION/SUMMARY
[de-identified] : The patient has left knee osteoarthritis. TAn extensive discussion was conducted on the natural history of the disease and the variety of surgical and non-surgical options available to the patient including, but not limited to non-steroidal anti-inflammatory medications, steroid injections, physical therapy, maintenance of ideal body weight, and reduction of activity. I recommended a prescription of Mobic but the patient would prefer to use OTC NSAIDs at this time. The patient will schedule an appointment as needed.  Informed consent for left knee injection  was obtained.  All risks, benefits and alternatives were discussed. These included but were not limited to bleeding, infection and allergic reaction. All questions were answered. A time out was performed. Left  knee were prepped and draped in sterile fashion. Using sterile technique, 40mg of Kenalog, 4cc of 1% lidocaine, 4cc of 0.25% marcaine using a 21-gauge needle. A sterile dressing was applied. Post injection instructions were reviewed. The patient tolerated the procedure well.

## 2024-12-18 ENCOUNTER — NON-APPOINTMENT (OUTPATIENT)
Age: 84
End: 2024-12-18

## 2024-12-18 ENCOUNTER — APPOINTMENT (OUTPATIENT)
Dept: ORTHOPEDIC SURGERY | Facility: CLINIC | Age: 84
End: 2024-12-18
Payer: MEDICARE

## 2024-12-18 VITALS — WEIGHT: 200 LBS | BODY MASS INDEX: 34.15 KG/M2 | HEIGHT: 64 IN

## 2024-12-18 DIAGNOSIS — M51.369: ICD-10-CM

## 2024-12-18 DIAGNOSIS — M60.9 MYOSITIS, UNSPECIFIED: ICD-10-CM

## 2024-12-18 DIAGNOSIS — M79.10 MYALGIA, UNSPECIFIED SITE: ICD-10-CM

## 2024-12-18 PROCEDURE — 99214 OFFICE O/P EST MOD 30 MIN: CPT | Mod: 25

## 2024-12-18 PROCEDURE — 20552 NJX 1/MLT TRIGGER POINT 1/2: CPT

## 2025-01-03 ENCOUNTER — APPOINTMENT (OUTPATIENT)
Dept: ORTHOPEDIC SURGERY | Facility: CLINIC | Age: 85
End: 2025-01-03
Payer: MEDICARE

## 2025-01-03 VITALS — WEIGHT: 200 LBS | HEIGHT: 64 IN | BODY MASS INDEX: 34.15 KG/M2

## 2025-01-03 DIAGNOSIS — M17.12 UNILATERAL PRIMARY OSTEOARTHRITIS, LEFT KNEE: ICD-10-CM

## 2025-01-03 PROCEDURE — 20610 DRAIN/INJ JOINT/BURSA W/O US: CPT | Mod: LT

## 2025-01-03 PROCEDURE — 99214 OFFICE O/P EST MOD 30 MIN: CPT | Mod: 25

## 2025-03-19 ENCOUNTER — APPOINTMENT (OUTPATIENT)
Dept: ORTHOPEDIC SURGERY | Facility: CLINIC | Age: 85
End: 2025-03-19
Payer: MEDICARE

## 2025-03-19 VITALS — BODY MASS INDEX: 34.15 KG/M2 | HEIGHT: 64 IN | WEIGHT: 200 LBS

## 2025-03-19 DIAGNOSIS — M51.369: ICD-10-CM

## 2025-03-19 DIAGNOSIS — M60.9 MYOSITIS, UNSPECIFIED: ICD-10-CM

## 2025-03-19 DIAGNOSIS — M79.10 MYALGIA, UNSPECIFIED SITE: ICD-10-CM

## 2025-03-19 PROCEDURE — 99214 OFFICE O/P EST MOD 30 MIN: CPT | Mod: 25

## 2025-03-19 PROCEDURE — 20552 NJX 1/MLT TRIGGER POINT 1/2: CPT

## 2025-03-19 RX ORDER — DICLOFENAC EPOLAMINE 0.01 G/1
1.3 SYSTEM TOPICAL
Qty: 30 | Refills: 0 | Status: ACTIVE | COMMUNITY
Start: 2025-03-19 | End: 1900-01-01

## 2025-04-04 ENCOUNTER — APPOINTMENT (OUTPATIENT)
Dept: ORTHOPEDIC SURGERY | Facility: CLINIC | Age: 85
End: 2025-04-04
Payer: MEDICARE

## 2025-04-04 VITALS — WEIGHT: 200 LBS | BODY MASS INDEX: 34.15 KG/M2 | HEIGHT: 64 IN

## 2025-04-04 DIAGNOSIS — M17.12 UNILATERAL PRIMARY OSTEOARTHRITIS, LEFT KNEE: ICD-10-CM

## 2025-04-04 PROCEDURE — 99214 OFFICE O/P EST MOD 30 MIN: CPT | Mod: 25

## 2025-04-04 PROCEDURE — 20610 DRAIN/INJ JOINT/BURSA W/O US: CPT | Mod: LT

## 2025-04-04 PROCEDURE — 73564 X-RAY EXAM KNEE 4 OR MORE: CPT | Mod: LT

## 2025-05-21 ENCOUNTER — APPOINTMENT (OUTPATIENT)
Dept: ORTHOPEDIC SURGERY | Facility: CLINIC | Age: 85
End: 2025-05-21

## 2025-05-21 ENCOUNTER — NON-APPOINTMENT (OUTPATIENT)
Age: 85
End: 2025-05-21

## 2025-05-21 VITALS — WEIGHT: 200 LBS | HEIGHT: 64 IN | BODY MASS INDEX: 34.15 KG/M2

## 2025-05-21 DIAGNOSIS — M51.369: ICD-10-CM

## 2025-05-21 DIAGNOSIS — M60.9 MYOSITIS, UNSPECIFIED: ICD-10-CM

## 2025-05-21 DIAGNOSIS — M79.10 MYALGIA, UNSPECIFIED SITE: ICD-10-CM

## 2025-05-21 PROCEDURE — 99214 OFFICE O/P EST MOD 30 MIN: CPT

## 2025-05-23 ENCOUNTER — APPOINTMENT (OUTPATIENT)
Dept: ORTHOPEDIC SURGERY | Facility: CLINIC | Age: 85
End: 2025-05-23
Payer: MEDICARE

## 2025-05-23 VITALS — BODY MASS INDEX: 35.85 KG/M2 | HEIGHT: 64 IN | WEIGHT: 210 LBS

## 2025-05-23 DIAGNOSIS — M17.12 UNILATERAL PRIMARY OSTEOARTHRITIS, LEFT KNEE: ICD-10-CM

## 2025-05-23 PROCEDURE — 99214 OFFICE O/P EST MOD 30 MIN: CPT

## 2025-05-23 PROCEDURE — G2211 COMPLEX E/M VISIT ADD ON: CPT

## 2025-07-01 ENCOUNTER — APPOINTMENT (OUTPATIENT)
Dept: ORTHOPEDIC SURGERY | Facility: CLINIC | Age: 85
End: 2025-07-01
Payer: MEDICARE

## 2025-07-01 VITALS — HEIGHT: 64 IN | BODY MASS INDEX: 34.15 KG/M2 | WEIGHT: 200 LBS

## 2025-07-01 PROCEDURE — 20610 DRAIN/INJ JOINT/BURSA W/O US: CPT | Mod: LT

## 2025-07-01 PROCEDURE — 99214 OFFICE O/P EST MOD 30 MIN: CPT | Mod: 25

## 2025-07-30 ENCOUNTER — APPOINTMENT (OUTPATIENT)
Dept: ORTHOPEDIC SURGERY | Facility: CLINIC | Age: 85
End: 2025-07-30